# Patient Record
Sex: MALE | Race: WHITE | NOT HISPANIC OR LATINO | Employment: UNEMPLOYED | URBAN - METROPOLITAN AREA
[De-identification: names, ages, dates, MRNs, and addresses within clinical notes are randomized per-mention and may not be internally consistent; named-entity substitution may affect disease eponyms.]

---
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
## 2020-01-24 ENCOUNTER — TELEPHONE (OUTPATIENT)
Dept: NEUROLOGY | Facility: CLINIC | Age: 20
End: 2020-01-24

## 2020-03-10 ENCOUNTER — HEALTH MAINTENANCE LETTER (OUTPATIENT)
Age: 20
End: 2020-03-10

## 2020-04-08 ASSESSMENT — ENCOUNTER SYMPTOMS
MYALGIAS: 1
JOINT SWELLING: 0
MUSCLE CRAMPS: 1
SKIN CHANGES: 0
POOR WOUND HEALING: 0
MUSCLE WEAKNESS: 1
STIFFNESS: 0
NAIL CHANGES: 0
ARTHRALGIAS: 0
NECK PAIN: 0
BACK PAIN: 0

## 2020-04-20 ENCOUNTER — VIRTUAL VISIT (OUTPATIENT)
Dept: NEUROLOGY | Facility: CLINIC | Age: 20
End: 2020-04-20
Payer: COMMERCIAL

## 2020-04-20 DIAGNOSIS — G60.0 HEREDITARY SENSORIMOTOR NEUROPATHY: Primary | ICD-10-CM

## 2020-04-20 NOTE — PROGRESS NOTES
"Noah Ennis is a 19 year old male who is being evaluated via a billable video visit.      The patient has been notified of following:     \"This video visit will be conducted via a call between you and your physician/provider. We have found that certain health care needs can be provided without the need for an in-person physical exam.  This service lets us provide the care you need with a video conversation.  If a prescription is necessary we can send it directly to your pharmacy.  If lab work is needed we can place an order for that and you can then stop by our lab to have the test done at a later time.    Video visits are billed at different rates depending on your insurance coverage.  Please reach out to your insurance provider with any questions.    If during the course of the call the physician/provider feels a video visit is not appropriate, you will not be charged for this service.\"    Patient has given verbal consent for Video visit? Yes    How would you like to obtain your AVS?     Patient would like the video invitation sent by: Send to e-mail at: [email or web address]          "

## 2020-04-20 NOTE — PROGRESS NOTES
"[facility], [place]   Muscular Dystrophy video visit Note  Noah Ennis  [id number]  04/20/2020    Noah Ennis is a 19 year old male who is being evaluated via a billable video visit.      The patient has been notified of following:     \"This video visit will be conducted via a call between you and your physician/provider. We have found that certain health care needs can be provided without the need for an in-person physical exam.  This service lets us provide the care you need with a video conversation.  If a prescription is necessary we can send it directly to your pharmacy.  If lab work is needed we can place an order for that and you can then stop by our lab to have the test done at a later time.    Video visits are billed at different rates depending on your insurance coverage.  Please reach out to your insurance provider with any questions.    If during the course of the call the physician/provider feels a video visit is not appropriate, you will not be charged for this service.\"    Patient has given verbal consent for Video visit? Yes    How would you like to obtain your AVS? 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       Interval history:   Patient reported he is alright. He joined a program transition program for cognitive improvement and adaptive life skills. He has been stable and showing no significant change. His main concern that his feet will get cold for the last 3 months.  In the last 3 months he is mainly staying at home. He walks around from time to time. Usually he can walk up to 2 miles without difficulty. When he walks he stops in times as he gets tired. He gets intermittent arms numbness mainly when they are crossed. It does not last for long time " He sleeping well. Appetite is alright. No change in vision or hearing. No breathing or swallowing issues. He denied any fall. No stumbling when he walks.     ROS:  A 10-point ROS was performed, negative except as per HPI.  Answers for HPI/ROS submitted by the patient on 4/8/2020   General Symptoms: No  Skin Symptoms: Yes  HENT Symptoms: No  EYE SYMPTOMS: No  HEART SYMPTOMS: No  LUNG SYMPTOMS: No  INTESTINAL SYMPTOMS: No  URINARY SYMPTOMS: No  REPRODUCTIVE SYMPTOMS: No  SKELETAL SYMPTOMS: Yes  BLOOD SYMPTOMS: No  NERVOUS SYSTEM SYMPTOMS: No  MENTAL HEALTH SYMPTOMS: No  PEDS Symptoms: No  Changes in hair: No  Changes in moles/birth marks: No  Itching: No  Rashes: No  Changes in nails: No  Acne: No  Change in facial hair: No  Warts: No  Non-healing sores: No  Scarring: No  Flaking of skin: No  Color changes of hands/feet in cold : Yes  Sun sensitivity: No  Skin thickening: No  Back pain: No  Muscle aches: Yes  Neck pain: No  Swollen joints: No  Joint pain: No  Bone pain: No  Muscle cramps: Yes  Muscle weakness: Yes  Joint stiffness: No  Bone fracture: No    PMH:  Past Medical History:   Diagnosis Date     ADHD (attention deficit hyperactivity disorder)      Asthma      Paresthesias     arms and legs       PSH:  Past Surgical History:   Procedure Laterality Date     DENTAL SURGERY       ELECTROMYOGRAM N/A 6/22/2016    Procedure: ELECTROMYOGRAM;  Surgeon: Darwin Walls MD;  Location:  PEDS SEDATION      ENDOSCOPY         Allergies:  Allergies   Allergen Reactions     Dust Mites      Mold      Seasonal Allergies      Strattera [Atomoxetine] Hives       Medications:    Current Outpatient Medications:      albuterol (PROAIR HFA, PROVENTIL HFA, VENTOLIN HFA) 108 (90 BASE) MCG/ACT inhaler, Inhale 2 puffs into the lungs every 4 hours as needed for shortness of breath / dyspnea or wheezing, Disp: , Rfl:      albuterol (PROVENTIL) (5 MG/ML) 0.5% nebulizer solution, Take 2.5 mg by nebulization every 6 hours as  needed for wheezing or shortness of breath / dyspnea, Disp: , Rfl:      budesonide-formoterol (SYMBICORT) 160-4.5 MCG/ACT inhaler, Inhale 2 puffs into the lungs as needed, Disp: , Rfl:      budesonide-formoterol (SYMBICORT) 80-4.5 MCG/ACT inhaler, Inhale 2 puffs into the lungs 2 times daily, Disp: , Rfl:      cetirizine (ZYRTEC ALLERGY) 10 MG tablet, Take 10 mg by mouth daily, Disp: , Rfl:      fluticasone (FLONASE) 50 MCG/ACT nasal spray, Spray 2 sprays into both nostrils 2 times daily , Disp: , Rfl:      Montelukast Sodium (SINGULAIR PO), Take 5 mg by mouth At Bedtime, Disp: , Rfl:      Multiple Vitamins-Minerals (MULTIVITAMIN GUMMIES ADULTS) CHEW, Take 2 chew tab by mouth daily, Disp: , Rfl:      Ascorbic Acid (VITAMIN C) 100 MG CHEW, , Disp: , Rfl:      CLONAZEPAM PO, Take 0.5 mg by mouth At Bedtime, Disp: , Rfl:      Methylphenidate HCl (METADATE CD PO), Take 50 mg by mouth daily, Disp: , Rfl:     Social History:  Social History     Tobacco Use     Smoking status: Never Smoker     Smokeless tobacco: Never Used   Substance Use Topics     Alcohol use: No     Alcohol/week: 0.0 standard drinks       Family History:  No family history on file.      Objective   There were no vitals taken for this visit.  General: patient sitting with no acute distress  HEENT: no oral ulcers. Normocephalic atraumatic   Neurological examination: Mental status:  Patient is alert, attentive, and oriented x 3.  Language is coherent and fluent without dysarthria or aphasia.  Memory, comprehension and ability to follow commands were intact.     Gait: he was able to walk on toes and heels. He was able to hub on one foot. He has negative Romberg's. He has some difficulty with tandem gait.   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 This is however is not certain. There is insufficient  evidence to definitively conclude that this variant is the cause of this  patient's neurologic findings. Analysis of other family members, if   possible, may help to clarify whether this variant is segregating with  the phentoype in the family. ).     Overall, he is doing well apart from coldness in his feet. He tries to keep active life style and doing exercises at home. His dad was not tested for the same mutation yet.      Plan:  - He was advised to do daily exercises    - Consider genetic testing for the patient's father for clarity of diagnosis- Mom told us she will ask the father and let us know if he is interested   -We discussed that our genetic counselor can also reach out  - Follow up in the [place] [facility] with Dr. Em in 1 year    Video-Visit Details    Type of service:  Video Visit    Video Start Time: 15:05  Video End Time: 15:25    Originating Location (pt. Location): Home    Distant Location (provider location):  [facility] NEUROLOGY     Mode of Communication:  Video Conference via [employer]      I personally examined this patient, reviewed vital signs and pertinent auxiliary test results.  This note details our findings, impression and plan that we formulated together.    I spent total of 20 minutes face-to-face with Noah Ennis during today's visit. Over 50% of this time was spent counseling the patient and coordinating care. See note for details.    Sincerely yours,      Darwin Walls MD  Pediatric Neurology  [phone]

## 2020-04-22 ENCOUNTER — TELEPHONE (OUTPATIENT)
Dept: CONSULT | Facility: CLINIC | Age: 20
End: 2020-04-22

## 2020-04-22 NOTE — TELEPHONE ENCOUNTER
Left a voicemail for Noah's mother, Ingrid, regarding referral for genetic counseling. I provided my call back number and asked Ingrid to call me back to schedule a genetic counseling visit.     Evangelina Bermudez MS, [facility]  Genetic Counselor  [facility]  Phone: [phone]  Fax: [phone]

## 2020-09-17 NOTE — TELEPHONE ENCOUNTER
Noah's mother Ingrid returned my call and we arranged for a telephone genetic counseling visit for Noah on Friday 9/25 at 10am. They can be called at [phone] for the visit.     Evangelina Bermudez MS, [facility]  Licensed Genetic Counselor  [facility]  Phone: [phone]  Fax: [phone]

## 2020-10-23 ENCOUNTER — TELEPHONE (OUTPATIENT)
Dept: CONSULT | Facility: CLINIC | Age: 20
End: 2020-10-23

## 2020-10-23 NOTE — TELEPHONE ENCOUNTER
Ingrid left me a voicemail saying they need to reschedule Noah's telephone genetic counseling visit today as he is not feeling well.     I called Ingrid back and left her a voicemail saying we will cancel today's visit. I gave her my phone number if she wants to leave me a message to reschedule. I also gave her my email address ([email or web address]) in case that form of communicating is better for them.     Evangelina Bermudez MS, [facility]  Licensed Genetic Counselor  [facility]  Phone: [phone]  Fax: [phone]

## 2020-10-28 ENCOUNTER — VIRTUAL VISIT (OUTPATIENT)
Dept: PEDIATRIC NEUROLOGY | Facility: CLINIC | Age: 20
End: 2020-10-28
Attending: PSYCHIATRY & NEUROLOGY
Payer: COMMERCIAL

## 2020-10-28 DIAGNOSIS — G60.0 CHARCOT-MARIE-TOOTH DISEASE: Primary | ICD-10-CM

## 2020-10-28 DIAGNOSIS — Z71.83 ENCOUNTER FOR NONPROCREATIVE GENETIC COUNSELING: ICD-10-CM

## 2020-10-28 PROCEDURE — 96040 HC GENETIC COUNSELING, EACH 30 MINUTES: CPT | Mod: 95 | Performed by: GENETIC COUNSELOR, MS

## 2020-10-28 NOTE — PROGRESS NOTES
"Noah Ennis is a 19 year old male who is being evaluated via a billable telephone visit.      The patient has been notified of following:     \"This telephone visit will be conducted via a call between you and your physician/provider. We have found that certain health care needs can be provided without the need for a physical exam.  This service lets us provide the care you need with a short phone conversation.  If a prescription is necessary we can send it directly to your pharmacy.  If lab work is needed we can place an order for that and you can then stop by our lab to have the test done at a later time.    Telephone visits are billed at different rates depending on your insurance coverage. During this emergency period, for some insurers they may be billed the same as an in-person visit.  Please reach out to your insurance provider with any questions.    If during the course of the call the physician/provider feels a telephone visit is not appropriate, you will not be charged for this service.\"    Patient has given verbal consent for Telephone visit?  Yes    What phone number would you like to be contacted at? [phone]      Phone call duration: 43 minutes    Tanesha Bermudez GC        [facility] GENETIC COUNSELING CONSULT NOTE    Date: 10/28/20     Presenting Information:   Noah Ennis is a 19 year old male referred to the [facility] due to Charcot-Olya-Tooth (CMT) disease. He was seen for a telephone genetic counseling appointment at the request of Dr. Wlals today. He was accompanied by his mother Ingrid.     Personal History:   Noah has a history of hereditary sensory and motor peripheral neuropathy. He first started noticing symptoms when he was about 13-14 years old and reports that his symptoms of back pain, fatigue, and tingling in his hands and feet have gotten progressively worse. He reports that his walking is getting worse as well and reports that his legs get " tired going up stairs. Noah has been followed by Dr. Walls for several years for his neuropathy. 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     Noah's other notable medical history includes diagnoses of ADHD, asthma, allergies, mild autism spectrum disorder, and carpal tunnel. He wears glasses and sees and optometrist annually. Noah denies any hearing loss, seizures, GI problems, and cardiac and respiratory concerns.    Please refer to Dr. Walls's most recent visit (4/20/20) for further details of Noah's medical history.    Family History: A three generation pedigree was obtained and scanned into the electronic medical record. The relevant portions are described below:      Siblings- Noah has a 21 year old brother who has Asperger's and is otherwise heathy.     Parents- Noah's mother, Ingrid, is 40 years old. She has a history of endometriosis but is otherwise healthy. Noah's father, Johnnie, is 45 years old. He has a history of numbness and tingling and possible lower extremity neuropathy since his 20's. He also has carpal tunnel and degenerative disk disease. He has not had a Neurology evaluation for his neuropathy.    Maternal Relatives- Noah has two maternal aunts. One aunt has a history of seizures in adulthood which are well controlled on medication. She has a 22 year old son and 17 year old daughter who are healthy. The other aunt has a history of thyroid problems. Noah's maternal grandmother has high cholesterol and is otherwise healthy. Noah's maternal grandfather has a history of high cholesterol and has a pacemaker placed.     Paternal Relatives- Noah has one paternal uncle and two paternal aunts. His uncle is alive and well, as are his children. One of his aunts has thyroid problems and skin cancer. She has a daughter with autism and diabetes and her other children are healthy. The other  aunt is alive and well, as are her children. Noah's paternal grandmother has a history of diabetes, thyroid problems, and skin cancer. Noah's paternal grandfather had a history of carpal tunnel and passed away at age 59 due to congestive heart failure.     Family history is otherwise largely non-contributory. Maternal ancestry is [place], Citizen of [place], and [place] and paternal ancestry is Citizen of [place], [place], [place]i, and English. Consanguinity was denied.     Previous Genetic Testin/29/16- PMP22 deletion/duplication analysis: negative (2 copies of PMP22)    16- CMT1, dominant intermediate, recessive intermediate CMT Panel (27 genes):    MPZ c.646-3C>G (intron 5) Variant of uncertain significance    Genetic Counseling Discussion:  We reviewed that our bodies are made of cells that contain our chromosomes which are made up of long stretches of DNA containing our genes. Our genes serve as the instructions for our bodies to grow and function. We have two copies of each gene, one inherited from our mother and one inherited from our father.    Noah and his family previously saw genetic counselor, Alyssa Lilly, so today we reviewed the previous genetic testing Noah had. Noah had testing for the PMP22 duplication associated with CMT1A, which is the most common type of type 1 CMT. The results of that testing were negative/normal. Then Noah had a 27 gene panel target toward CMT1, dominant intermediate CMT, and recessive intermediate CMT based on Noah's EMG findings. This panel identified one variant of uncertain significance (VUS) in the MPZ gene called c.646-3C>G (intron 5). We reviewed that a VUS means the lab found a change in the gene but they do not have enough evidence or data yet to know if the change in the gene is associated with causing disease (pathogenic) or if it is a harmless change (benign).     The MPZ gene is associated with with various types of autosomal dominant CMT:  CMT1B, CMT2I/J, and DI-CMTD. Autosomal dominant means an individual needs one pathogenic variant in the gene in order to be affected. It is possible that this could be the explanation of Noah's CMT, but we do not have enough evidence about this variant to determine that at this time. Previously parental testing of this MPZ VUS was recommended to determine if this variant was inherited from Noah's father (who has some history of neuropathy), his mother, or if it is a new change (de madelaine) in Noah. Parental studies may help us with our interpretation of the variant but they may not be enough to re-classify the VUS at this time. The 27 gene panel Noah had in 2018 did not include all genes known to cause CMT, so It is also possible that he could have a pathogenic variant in a different gene that is causing his neuropathy symptoms.     Parental testing for the MPZ VUS was discussed previously and declined. We discussed this option again today. We also discussed doing another Next Generation Sequencing (NGS) panel for analysis of additional genes known to be associated with hereditary neuropathies, with the aim of determining what condition is causing Noah's neuropathy/CMT symptoms. We discussed the availability of a sponsored gene panel test through [facility] sponsored by [facility]: [facility]. Because the testing is a part of a sponsored program, the testing is free of charge but [facility], as the sponsoring party, can share the genetic information with third parties for research purposes. No personally identifiable information will be shared, only the gene mutation information and clinical indications. We can also do this genetic testing panel through Noah's insurance if they prefer. This panel will analyze the MPZ gene again and will find the same variant found previously, but I do not have updated information on how the lab will classify this variant.     We  went on to discuss the details, limitations, and possible outcomes of NGS. In particular, We discussed that there are three possible results from NGS:    Negative: meaning normal or no mutations are identified in the genes that were tested/sequenced    Positive: meaning a mutation that is known to be associated with a particular set of symptoms is identified    Variant of uncertain significance (VUS): meaning a change in the DNA sequence of a particular gene was seen but there is not enough information or data yet to know if it explains the symptoms. If a VUS is identified, testing of other relatives may be helpful to provide clarification.  In most cases, identification of a VUS does not confirm a diagnosis and does not result in any clinically actionable recommendations.    We discussed the potential benefits of genetic testing and why this genetic testing is medically indicated. A positive result will help determine the etiology of the neuropathy noted in Noah and may guide the medical management for him. Also, if a genetic cause is found for Noah's neuropathy, it will give us a more accurate risk assessment for other family members, particularly Noah's future children.    Next Generation Sequencing is a well established technology utilized by all molecular genetic labs throughout the country for identifying disease-causing mutations in various genes.  Next Generation Sequencing is currently the standard of care for genetic testing of single genes.  The recommended testing for Noah  is DIAGNOSTIC testing, and it is NOT investigational.    Noah consented to genetic testing today. He would prefer to do the [facility] panel through insurance. We will submit information for prior authorization and Noah will be contacted regarding the authorization status and potential cost of testing. If we proceed with testing, a saliva sample collection kit will be mailed to Noah and  I will call him with the results.     It was a pleasure meeting Noah and his mother today. They were encouraged to reach out to me if they have any further questions.     Plan:  [facility]. [facility]      Evangelina Bermudez MS, [facility]  Licensed Genetic Counselor  [facility]  Phone: [phone]  Fax: [phone]    Time spent in consultation was approximately 45 minutes.

## 2020-11-15 DIAGNOSIS — G60.0 CHARCOT-MARIE-TOOTH DISEASE: ICD-10-CM

## 2020-11-27 LAB — MISCELLANEOUS TEST: NORMAL

## 2020-12-07 ENCOUNTER — TELEPHONE (OUTPATIENT)
Dept: PEDIATRIC NEUROLOGY | Facility: CLINIC | Age: 20
End: 2020-12-07

## 2020-12-07 NOTE — TELEPHONE ENCOUNTER
I spoke with Noah's mother, Ingrid, who was present for his genetic counseling visit regarding the results of Noah's genetic testing. We reviewed that Noah had genetic testing for 81 genes associated with various types of hereditary neuropathy. This large gene panel identified one variant of uncertain significance in the MPZ gene called c.646-1C>G (intronic). This is the same variant that was identified on Noah's CMT1 panel.     Unfortunately, this test did not identify any new variants in the new genes that were analyzed and this genetic testing laboratory ([facility]) has this variant classified as a variant of uncertain (VUS) significance just like Noah's testing at the [facility] in 2016. [facility] is offering free parental testing as a part of their VUS resolution program. The parental testing will only look for this MPZ variant in Noah's parents and will not be analyzing other genes. Ingrid and Johnnie are interested in parental testing so [facility] saliva sample collection kits to her and Johnnie for sample collection. I will call them as soon as I have the results.     Evangelina Bermudez MS, [facility]  Licensed Genetic Counselor  [facility]  Phone: [phone]  Fax: [phone]

## 2021-01-22 ENCOUNTER — TELEPHONE (OUTPATIENT)
Dept: PEDIATRIC NEUROLOGY | Facility: CLINIC | Age: 21
End: 2021-01-22

## 2021-01-22 NOTE — TELEPHONE ENCOUNTER
I spoke with Noah's mother, Ingrid, about the results of the parental VUS testing we did for both of Noah's parents for the variant of uncertain significance in the MPZ gene called c.646-1C>G (intronic).     This VUS was identified in Noah's mother, Ingrid, and was not identified in his father, Johnnie. Given that his father is the one who has some neuropathy symptoms (numbness and tingling and possible lower extremity neuropathy since his 20's) and that this variant was not found in him, this lowers my suspicion that this MPZ variant is the main contributing cause to Tinos CMT. However, this variant is still classified as a variant of uncertain significance at this time.    We had previously discussed the option of a broader genetic test such as exome sequencing to determine the cause of Tinos CMT. Ingrid and I discussed this briefly again and she said she would talk with Noah and Johnnie to see if they were interested in pursuing further testing. She will email me if they are interesting in further testing. In the meantime, I will share these results with Dr. Walls and I will main Ingrid and Johnnie copies of their test reports. Ingrid was encouraged to reach out to me if the family has any other questions.     Evangelina Bermudez MS, [facility]  Licensed Genetic Counselor  [facility]  Phone: [phone]  Fax: [phone]

## 2021-02-06 ENCOUNTER — MYC MEDICAL ADVICE (OUTPATIENT)
Dept: PEDIATRIC NEUROLOGY | Facility: CLINIC | Age: 21
End: 2021-02-06

## 2021-02-11 NOTE — TELEPHONE ENCOUNTER
I called Noha's mother, Ingrid, to discuss some other testing options. I told Ingrid that I spoke with Dr. Walls and we both think it would be a good idea to get Noah's father, Johnnie, evaluated and possibly do an EMG to get a better clinical picture of his likely neuropathy. This might help give us more information about if Noah and his dad have a similar presentation (meaning this is more likely to be genetic) or if they have different presentations and if there could be a different underlying cause for Noah's neuropathy. If the family is interested in pursuing further genetic testing, the next option would be exome sequencing which looks at almost all of the genes. This testing is best done when parental samples are included in the analysis and again it would be beneficial to get a good clinical picture of Noah's father for the analysis on this testing. The yield of exome sequencing is probably a little lower since we have tested Noah for the most likely CMT genes on the previous multi-gene panels, but there are still a few CMT genes or candidate genes that he has not been tested for.     Ingrid appreciated this information and will discuss more with Dr. Walls at Noah's upcoming video visit on 3/1/21. She also asked that I look into the insurance coverage/cost of exome sequencing so they have that information to consider as well. I will ask the genetic testing lab to do a benefits investigation and send Ingrid the cost information.     Evangelina Bermudez MS, [facility]  Licensed Genetic Counselor  [facility] [place] [facility]  Phone: [phone]  Fax: [phone]

## 2021-03-01 ENCOUNTER — VIRTUAL VISIT (OUTPATIENT)
Dept: NEUROLOGY | Facility: CLINIC | Age: 21
End: 2021-03-01
Payer: COMMERCIAL

## 2021-03-01 DIAGNOSIS — G60.9 IDIOPATHIC PERIPHERAL NEUROPATHY: Primary | ICD-10-CM

## 2021-03-01 PROCEDURE — 99213 OFFICE O/P EST LOW 20 MIN: CPT | Mod: GT | Performed by: PSYCHIATRY & NEUROLOGY

## 2021-03-01 RX ORDER — ASCORBIC ACID 100 MG
1 TABLET,CHEWABLE ORAL DAILY
COMMUNITY
End: 2021-11-09

## 2021-03-01 RX ORDER — DULOXETIN HYDROCHLORIDE 60 MG/1
60 CAPSULE, DELAYED RELEASE ORAL DAILY
COMMUNITY
Start: 2020-11-12

## 2021-03-01 RX ORDER — ONDANSETRON 8 MG/1
8 TABLET, FILM COATED ORAL EVERY 8 HOURS PRN
COMMUNITY
Start: 2020-10-24 | End: 2021-10-24

## 2021-03-01 RX ORDER — SODIUM FLUORIDE 5 MG/ML
2 PASTE, DENTIFRICE DENTAL DAILY
COMMUNITY
Start: 2020-10-07

## 2021-03-01 NOTE — PROGRESS NOTES
Noah is a 20 year old who is being evaluated via a billable video visit.      How would you like to obtain your AVS? Mychart  If the video visit is dropped, the invitation should be resent by: [phone]        Video-Visit Details    Type of service:  Video Visit      Originating Location (pt. Location): Home    Distant Location (provider location):  [facility] NEUROLOGY CLINIC [place]     Platform used for Video Visit: [employer]

## 2021-03-01 NOTE — LETTER
3/1/2021       RE: Noah Ennis  [address]  [facility] [address]     Dear Colleague,    Thank you for referring your patient, Noah Ennis, to the [facility] NEUROLOGY CLINIC [place] at [facility]. Please see a copy of my visit note below.    Noah is a 20 year old who is being evaluated via a billable video visit.      How would you like to obtain your AVS? Mychart  If the video visit is dropped, the invitation should be resent by: [phone]        Video-Visit Details    Type of service:  Video Visit      Originating Location (pt. Location): Home    Distant Location (provider location):  [facility] NEUROLOGY [facility]     Platform used for Video Visit: [employer]                   [facility]                  Muscular Dystrophy Video Clinic Note     Noah Ennis MRN# [id number]   YOB: 2000 Age: 20 year old      Date of Visit: Mar 1, 2021    Primary care provider: Katerin Smith      History is obtained from the patient, family and medical record       Interval Change:      Noah Ennis is a 20 year old male was seen and examined at the muscular dystrophy clinic on Mar 1, 2021 via video visit for evaluation of previously 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 He reports concerns regarding worsening of his symptoms. He reports that his legs are getting worse. As he is taking stairs he feels he needs to be more careful. He does not fall bu he stumbles and trip He has no changes in his hands . He has occasional tingling. He has some issues with balance. He feels clamsy.  He uses insole but no AFO.               Allergies:      Allergies   Allergen Reactions     Dust Mites      Mold      Seasonal Allergies      Strattera [Atomoxetine] Hives             Medications:     Prescription Medications as of 3/1/2021       Rx Number Disp Refills  Start End Last Dispensed Date Next Fill Date Owning Pharmacy    albuterol (PROAIR HFA, PROVENTIL HFA, VENTOLIN HFA) 108 (90 BASE) MCG/ACT inhaler            Sig: Inhale 2 puffs into the lungs every 4 hours as needed for shortness of breath / dyspnea or wheezing    Class: Historical    Route: Inhalation    albuterol (PROVENTIL) (5 MG/ML) 0.5% nebulizer solution            Sig: Take 2.5 mg by nebulization every 6 hours as needed for wheezing or shortness of breath / dyspnea    Class: Historical    Route: Nebulization    Ascorbic Acid (VITAMIN C) 100 MG CHEW        [facility] - [place], [place] - [address]    Class: Historical    Route: Oral    budesonide-formoterol (SYMBICORT) 160-4.5 MCG/ACT inhaler            Sig: Inhale 2 puffs into the lungs as needed    Class: Historical    Route: Inhalation    budesonide-formoterol (SYMBICORT) 80-4.5 MCG/ACT inhaler            Sig: Inhale 2 puffs into the lungs 2 times daily    Class: Historical    Route: Inhalation    cetirizine (ZYRTEC ALLERGY) 10 MG tablet            Sig: Take 10 mg by mouth daily    Class: Historical    Route: Oral    CLONAZEPAM PO            Sig: Take 0.5 mg by mouth At Bedtime    Class: Historical    Route: Oral    DULoxetine (CYMBALTA) 60 MG capsule    11/12/2020        Sig: Take 60 mg by mouth daily    Class: Historical    Route: Oral    fluticasone (FLONASE) 50 MCG/ACT nasal spray            Sig: Spray 2 sprays into both nostrils 2 times daily     Class: Historical    Route: Both Nostrils    Methylphenidate HCl (METADATE CD PO)            Sig: Take 50 mg by mouth daily    Class: Historical    Route: Oral    Montelukast Sodium (SINGULAIR PO)            Sig: Take 5 mg by mouth At Bedtime    Class: Historical    Route: Oral    Multiple Vitamin (QUINTABS) TABS            Sig: Take 1 tablet by mouth daily    Class: Historical    Route: Oral    Multiple Vitamins-Minerals (MULTIVITAMIN GUMMIES ADULTS) CHEW            Sig: Take 2 chew tab by mouth daily     Class: Historical    Route: Oral    ondansetron (ZOFRAN) 8 MG tablet    10/24/2020 10/24/2021       Sig: Take 8 mg by mouth every 8 hours as needed    Class: Historical    Route: Oral    sodium fluoride (DENTA 5000 PLUS) 1.1 % CREA    10/7/2020        Sig: Take 2 Application by mouth daily    Class: Historical    Route: Oral                Review of Systems:   The 10 point Review of Systems is negative other than noted in the HPI         Physical Exam:     There were no vitals taken for this visit.   Physical Exam:   General: NAD  Neurologic:   Mental Status Exam: Alert, awake and easily engaged in interaction.   Cranial Nerves: PERRLA, EOMs intact, no nystagmus, facial movements symmetric,            Motor:Ambulatory. Able to walk with minimal steppage   Gait:Normal              Assessment and Recommendations:   Noah Ennis is a 20 year old male who presents with idiopathic sensory and motor peripheral polyneuropathy previously thought due to presumed etiology due to heterozygous MPZ gene mutation (CMT1B) (One variant with possible clinical  significance was detected in the MPZ gene). His mother was found to sghare the same mutation which makes Noah's etiology unclear. Due to worsening and unknown specific etiology repeat testing with EMG is recommended.     Recommendations:  -Repeat EMG  -PT evaluation  -Consider braces  -Follow up in 1 year or sooner.    Noah is a 20 year old who is being evaluated via a billable video visit.      How would you like to obtain your AVS? MyChart  If the video visit is dropped, the invitation should be resent by:   Will anyone else be joining your video visit? No    Video Start Time: 6:40 PM      Video-Visit Details    Type of service:  Video Visit    Video End Time:6:55 PM    Originating Location (pt. Location): Home    Distant Location (provider location):  [facility] NEUROLOGY [facility]     Platform used for Video Visit: [employer]  I have spent  20 min  spent on the date of the encounter in chart review, patient visit, review of tests, counseling the patient, documentation and/or discussion with other providers about the issues documented above. See note for details.    Sincerely,        Darwin Walls MD  Pediatric Neurology  [phone]           Patient Care Team:  Katerin Smith MD as PCP - General (Family Practice)  Nelson, Merlin V as Referring Physician (Neurology)      Copy to patient  ROSA ROCHA  [address] [facility] [address]

## 2021-03-02 NOTE — PROGRESS NOTES
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 He reports concerns regarding worsening of his symptoms. He reports that his legs are getting worse. As he is taking stairs he feels he needs to be more careful. He does not fall bu he stumbles and trip He has no changes in his hands . He has occasional tingling. He has some issues with balance. He feels clamsy.  He uses insole but no AFO.               Allergies:      Allergies   Allergen Reactions     Dust Mites      Mold      Seasonal Allergies      Strattera [Atomoxetine] Hives             Medications:     Prescription Medications as of 3/1/2021       Rx Number Disp Refills Start End Last Dispensed Date Next Fill Date Owning Pharmacy    albuterol (PROAIR HFA, PROVENTIL HFA, VENTOLIN HFA) 108 (90 BASE) MCG/ACT inhaler            Sig: Inhale 2 puffs into the lungs every 4 hours as needed for shortness of breath / dyspnea or wheezing    Class: Historical    Route: Inhalation    albuterol (PROVENTIL) (5 MG/ML) 0.5% nebulizer solution            Sig: Take 2.5 mg by nebulization every 6 hours as needed for wheezing or shortness of breath / dyspnea    Class: Historical    Route: Nebulization    Ascorbic Acid (VITAMIN C) 100 MG CHEW        [facility] - [place], [place] - [address]    Class: Historical    Route: Oral    budesonide-formoterol (SYMBICORT) 160-4.5 MCG/ACT inhaler            Sig: Inhale 2 puffs into the lungs as needed     Class: Historical    Route: Inhalation    budesonide-formoterol (SYMBICORT) 80-4.5 MCG/ACT inhaler            Sig: Inhale 2 puffs into the lungs 2 times daily    Class: Historical    Route: Inhalation    cetirizine (ZYRTEC ALLERGY) 10 MG tablet            Sig: Take 10 mg by mouth daily    Class: Historical    Route: Oral    CLONAZEPAM PO            Sig: Take 0.5 mg by mouth At Bedtime    Class: Historical    Route: Oral    DULoxetine (CYMBALTA) 60 MG capsule    11/12/2020        Sig: Take 60 mg by mouth daily    Class: Historical    Route: Oral    fluticasone (FLONASE) 50 MCG/ACT nasal spray            Sig: Spray 2 sprays into both nostrils 2 times daily     Class: Historical    Route: Both Nostrils    Methylphenidate HCl (METADATE CD PO)            Sig: Take 50 mg by mouth daily    Class: Historical    Route: Oral    Montelukast Sodium (SINGULAIR PO)            Sig: Take 5 mg by mouth At Bedtime    Class: Historical    Route: Oral    Multiple Vitamin (QUINTABS) TABS            Sig: Take 1 tablet by mouth daily    Class: Historical    Route: Oral    Multiple Vitamins-Minerals (MULTIVITAMIN GUMMIES ADULTS) CHEW            Sig: Take 2 chew tab by mouth daily    Class: Historical    Route: Oral    ondansetron (ZOFRAN) 8 MG tablet    10/24/2020 10/24/2021       Sig: Take 8 mg by mouth every 8 hours as needed    Class: Historical    Route: Oral    sodium fluoride (DENTA 5000 PLUS) 1.1 % CREA    10/7/2020        Sig: Take 2 Application by mouth daily    Class: Historical    Route: Oral                Review of Systems:   The 10 point Review of Systems is negative other than noted in the HPI         Physical Exam:     There were no vitals taken for this visit.   Physical Exam:   General: NAD  Neurologic:   Mental Status Exam: Alert, awake and easily engaged in interaction.   Cranial Nerves: PERRLA, EOMs intact, no nystagmus, facial movements symmetric,            Motor:Ambulatory. Able to walk with minimal  steppage   Gait:Normal              Assessment and Recommendations:   Noah Ennis is a 20 year old male who presents with idiopathic sensory and motor peripheral polyneuropathy previously thought due to presumed etiology due to heterozygous MPZ gene mutation (CMT1B) (One variant with possible clinical  significance was detected in the MPZ gene). His mother was found to sghare the same mutation which makes Noah's etiology unclear. Due to worsening and unknown specific etiology repeat testing with EMG is recommended.     Recommendations:  -Repeat EMG  -PT evaluation  -Consider braces  -Follow up in 1 year or sooner.    Noah is a 20 year old who is being evaluated via a billable video visit.      How would you like to obtain your AVS? MyChart  If the video visit is dropped, the invitation should be resent by:   Will anyone else be joining your video visit? No    Video Start Time: 6:40 PM      Video-Visit Details    Type of service:  Video Visit    Video End Time:6:55 PM    Originating Location (pt. Location): Home    Distant Location (provider location):  [facility] NEUROLOGY CLINIC [place]     Platform used for Video Visit: [employer]  I have spent  20 min spent on the date of the encounter in chart review, patient visit, review of tests, counseling the patient, documentation and/or discussion with other providers about the issues documented above. See note for details.    Sincerely,        Sarah Kenney MD  Pediatric Neurology  [phone]         CC  Patient Care Team:  Katerin Smith MD as PCP - General (Family Practice)  Nelson, Merlin V as Referring Physician (Neurology)  Sarah Kenney MD as MD (Pediatric Neurology)  Sarah Kenney MD as Assigned Neuroscience Provider  SARAH KENNEY    Copy to patient  NOAH ENNIS  [address] [facility] [address]

## 2021-04-24 ENCOUNTER — HEALTH MAINTENANCE LETTER (OUTPATIENT)
Age: 21
End: 2021-04-24

## 2021-07-08 ENCOUNTER — MYC MEDICAL ADVICE (OUTPATIENT)
Dept: NEUROLOGY | Facility: CLINIC | Age: 21
End: 2021-07-08

## 2021-07-20 ENCOUNTER — TELEPHONE (OUTPATIENT)
Dept: NEUROLOGY | Facility: CLINIC | Age: 21
End: 2021-07-20

## 2021-07-20 NOTE — TELEPHONE ENCOUNTER
LM for patients mother to call back and set up an EMG in peds discovery.     Shanta Engel   Community Ref[place] [address] [address]  [phone]  [email or web address]

## 2021-07-21 NOTE — TELEPHONE ENCOUNTER
LM for patients mother to call me back again for and EMG with Dr. HOOPER Told mom will be a sept visit.    Shanta Engel   [facility] Referral Specialist  [address]  [phone]  [email or web address]

## 2021-07-22 NOTE — TELEPHONE ENCOUNTER
Spoke with patients mother and scheduled EMG in discovery clinic per Dr. HOOPER Confirm appt with mom.    Shanta Engel   [facility] Referral Specialist  [address]  [phone]  [email or web address]

## 2021-10-08 ENCOUNTER — TELEPHONE (OUTPATIENT)
Dept: NEUROLOGY | Facility: CLINIC | Age: 21
End: 2021-10-08

## 2021-10-08 NOTE — TELEPHONE ENCOUNTER
[facility] Call Center    Phone Message    May a detailed message be left on voicemail: yes     Reason for Call: mom needs to make an appt     Action Taken: Other: peds md    Travel Screening: Not Applicable

## 2021-10-09 ENCOUNTER — HEALTH MAINTENANCE LETTER (OUTPATIENT)
Age: 21
End: 2021-10-09

## 2021-11-05 PROBLEM — G60.0 CMT (CHARCOT-MARIE-TOOTH DISEASE): Status: ACTIVE | Noted: 2021-11-05

## 2021-11-05 LAB — LAB SCANNED RESULT: ABNORMAL

## 2021-11-09 ENCOUNTER — HOSPITAL ENCOUNTER (OUTPATIENT)
Dept: PHYSICAL THERAPY | Facility: CLINIC | Age: 21
Setting detail: THERAPIES SERIES
End: 2021-11-09
Attending: PSYCHIATRY & NEUROLOGY
Payer: COMMERCIAL

## 2021-11-09 ENCOUNTER — PATIENT OUTREACH (OUTPATIENT)
Dept: CARE COORDINATION | Facility: CLINIC | Age: 21
End: 2021-11-09
Payer: COMMERCIAL

## 2021-11-09 ENCOUNTER — DOCUMENTATION ONLY (OUTPATIENT)
Dept: ORTHOPEDICS | Facility: CLINIC | Age: 21
End: 2021-11-09

## 2021-11-09 ENCOUNTER — OFFICE VISIT (OUTPATIENT)
Dept: NEUROLOGY | Facility: CLINIC | Age: 21
End: 2021-11-09
Payer: COMMERCIAL

## 2021-11-09 VITALS
BODY MASS INDEX: 19.79 KG/M2 | SYSTOLIC BLOOD PRESSURE: 107 MMHG | RESPIRATION RATE: 12 BRPM | DIASTOLIC BLOOD PRESSURE: 72 MMHG | WEIGHT: 134 LBS | HEART RATE: 71 BPM | OXYGEN SATURATION: 99 %

## 2021-11-09 DIAGNOSIS — M25.373 ANKLE INSTABILITY: ICD-10-CM

## 2021-11-09 DIAGNOSIS — M21.6X1 ACQUIRED SUPINATION OF FOOT, RIGHT: ICD-10-CM

## 2021-11-09 DIAGNOSIS — N94.9 CMT (CERVICAL MOTION TENDERNESS): ICD-10-CM

## 2021-11-09 DIAGNOSIS — M21.6X2 ACQUIRED SUPINATION OF FOOT, LEFT: ICD-10-CM

## 2021-11-09 DIAGNOSIS — G60.0 CMT (CHARCOT-MARIE-TOOTH DISEASE): Primary | ICD-10-CM

## 2021-11-09 PROCEDURE — 97110 THERAPEUTIC EXERCISES: CPT | Mod: GP | Performed by: PHYSICAL THERAPIST

## 2021-11-09 PROCEDURE — 97161 PT EVAL LOW COMPLEX 20 MIN: CPT | Mod: GP | Performed by: PHYSICAL THERAPIST

## 2021-11-09 PROCEDURE — 99214 OFFICE O/P EST MOD 30 MIN: CPT | Mod: GC | Performed by: PSYCHIATRY & NEUROLOGY

## 2021-11-09 NOTE — Clinical Note
Hi Dr. Em, there is a pending orthotics order in this clinic encounter for you to sign. Let me know if you are unable to acces. I can send a different encounter if needed. Thanks.

## 2021-11-09 NOTE — PROGRESS NOTES
Social Work Note  [facility]     Patient Name:  Noah Ennis  /Age:  2000 (20 year old)    Referral Source: Dr Em - [facility]  Reason for Referral:  Housing     saw Patient in person as part of [facility] on 21, along with his mother, Ingrid.  Patient resides in [place] along with his mother and father.  He has one older brother that also resides in the area independently.  Patients main concerns and questions were surrounding housing and employment.      Patient and mother reported that he has completed high school and is currently part of a Step Program connected to the school system.  He shared that he has been diagnosed with Autism and ADHD.  He receives Social Security.  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      Noah shared that his goal is to find housing and a place of his own, mom agreed sharing that this is a good idea.  Patient stated that he currently doesn't have a  via the [facility].  Ingrid shared that one was supposed to be instated via his work program, but it never happened. Writer encouraged them to continue to persue this support and guided them through the process to get on the housing list via [facility].  Noah and his mother, Ingrid were both very understanding of process and [facility] resources.     Patient and Ingrid denied any additional concerns or questions.  Sw will continue to follow and assist as needed.  Provided writer contact information and encouraged them to call with additional concerns or questions.             ADONIS Larson, [facility]    [facility]  [phone]  [email or web address]

## 2021-11-09 NOTE — PATIENT INSTRUCTIONS
1. Recommend evaluation from Sports Medicine Provider for knee, external referral placed for you to be seen locally  2. Discuss with primary doctor a drug holiday trial from Duloxetine (will need to taper)  3. Track episodes of your chest pain and discuss possible causes with your primary care

## 2021-11-09 NOTE — PROGRESS NOTES
"S.  Pt. was seen today, at Dr. Em's CMT clinic in [place], for an evaluation for lower extremity bracing.    O.Goal.  To help stabilize ankles and reduce supination due to CMT.    A.  Pt. currently has custom made FO's that had worked for him well in the past but is due to new orthotics due to general wear.  His current fo's are several years old.  He is an active 20 year old male that wears hiking boots for extra support.  He is in need of fo's to help stabilize his ankles and help reduce supination.  For this, I have taken new biofoam impressions and will have new custom fo's fabricated for him with 1/8\" lateral wedges.  The molds have been sent to our main lab for fabrication.    P.  Pt. is scheduled to arrive back to our location in approx. 2 weeks for the fitting.  Pt. has been instructed to contact our facility with any future questions and/or concerns.    Good CAN/MICHAEL  "

## 2021-11-09 NOTE — PROGRESS NOTES
Matthew Ennis is a 20Y male RHD with genetically confirmed CMT1B here to transfer care.     History of present illness:   Having worsening pain in his right> left knee pain for the last few month. Denies pains in other parts of the leg. Feels sharp (electric shock?), that is a 2/10. The more active he is the worse it is. Occasionally will swell, impoved with tylenol/NSAID/wrapping. Denies injury or new activities.     Has weakness in his legs, right or worse than left, denies limiting his activities, but it is painful (muscle sore) afterwards. Denies weakness in his hands. Has some numbness/tingling in his feet, denies symptoms in hands (does have positional tingling). Has insoles but no orthotics in his shoes. Has some issues with tripping, but denies falls. Takes Cymbalta for tingling pain which is largely not helpul.     Has been having substernal chest pain with shortness of breath, happening occasionally and not necessarily associated with activity. Thinks it may occur daily, lasting for a few minutes. Thinks it may be tender to touch when symptomatic. Does get itchy all over occasionally. Denies DV, dysphagia.     Symptoms started in approx 0244-7397 with numbness/tingling in the feet. Noticed that when he was very young he could hold onto an electric fence/dog collar and not have significant pain. Symptoms have been progressive.      Previous work-up:     Genetic testing: heterozygous MPZ gene mutation (CMT1B)    EMG 2016: normal sensory and motor amplitudes with prolonged distal latencies. Slowed CV (Tibial,   Peroneal- approx 35m/s, normal UE) with normal needle study of left upper and lower.     PMH:   Past Medical History:   Diagnosis Date     ADHD (attention deficit hyperactivity disorder)      Asthma      Paresthesias     arms and legs   Concussion x2 - 2018 (LOC for seconds x1)    PSH:   Past Surgical History:   Procedure Laterality Date     DENTAL SURGERY       ELECTROMYOGRAM N/A 6/22/2016    Procedure:  ELECTROMYOGRAM;  Surgeon: Darwin Walls MD;  Location:  PEDS SEDATION      ENDOSCOPY         Social Hx:   Works in cafeteria  In school step program  Denies tobacco/alcohol/drug use    Family Hx:   Mother with same mutation, some tingling in the AM if sleeps wrong  Dad with knee pain, some tingling in the AM if sleeps wrong  1 brother (1yr older) without symptoms  2 maternal aunts asymptomatic (one seizure with history of seizure)  Maternal grandparents asymptomatic     Medications:   Current Outpatient Medications   Medication     albuterol (PROAIR HFA, PROVENTIL HFA, VENTOLIN HFA) 108 (90 BASE) MCG/ACT inhaler     albuterol (PROVENTIL) (5 MG/ML) 0.5% nebulizer solution     budesonide-formoterol (SYMBICORT) 160-4.5 MCG/ACT inhaler     cetirizine (ZYRTEC ALLERGY) 10 MG tablet     CLONAZEPAM PO     DULoxetine (CYMBALTA) 60 MG capsule     fluticasone (FLONASE) 50 MCG/ACT nasal spray     Methylphenidate HCl (METADATE CD PO)     Montelukast Sodium (SINGULAIR PO)     Multiple Vitamins-Minerals (MULTIVITAMIN GUMMIES ADULTS) CHEW     sodium fluoride (DENTA 5000 PLUS) 1.1 % CREA     Ascorbic Acid (VITAMIN C) 100 MG CHEW     budesonide-formoterol (SYMBICORT) 80-4.5 MCG/ACT inhaler     Multiple Vitamin (QUINTABS) TABS     No current facility-administered medications for this visit.       Physical Exam  /72   Pulse 71   Resp 12   Wt 60.8 kg (134 lb)   SpO2 99%   BMI 19.79 kg/m      MSK: tenderness to palpation over the right medial meniscal border. Some pain on testing of medial meniscus. Non-hypermobile knee cap compared to left. ACL intact. Some pain with log roll in the right hip. Hip abductors 4/5 bilaterally; no obvious hammertoes or high arches    Mental state: Alert, appropriate, speech, language, and thought content normal.     Cranial nerves II-XII normal.    Sensory examination:     Right Left   Light touch Normal Normal   Vibration (timed)     Vibration (Rydell-Seiffer) 7.5 8   Temp     Pin  Normal - increased sensation over feet Normal - increased sensation over feet   Pos intact intact   Legend:   MM = medial malleolus, TT = tibial tuberosity, K = patella, MCP = MCP joint  MF = mid-foot, DC = distal calf, MC = mid calf, PC = proximal calf      Motor examination:     Right Left   Shoulder abduction  5 5   Elbow extension 5 5   Elbow flexion 5 5   Wrist extension  5 5   Finger extension 5 5   FDI 5 4+   APB 4+ 5=4+   Hip flexion 5 5   Knee flexion 5 5   Knee extension 5 5   Dorsiflexion 5 5   Plantar flexion 5 5   A=atrophy    Tone normal     Reflexes:   Right Left   Biceps 2 2   BRD 2 2   Triceps 2 2   Segundo     Patellar 2 2   Achilles 1 1   Plantar Flexor Flexor   Clonus Absent Absent      Coordination:  Finger-nose normal.    Gait:  Normal.    Additional tests:  None    Impression:  Matthew Ennis is a 20Y male RHD with genetically confirmed CMT1B currently doing well. Discussed pathophysiology of CMT with patient and his mother. Discussed that this is a slowly progressive disease involving loss of muscle/sensation of the feet and hands. There are no disease-related treatments at this time, but symptomatic treatment in the mainstay. He has some complaints of weakness and numbness, but his exam is largely reassuring at this time. His largest concern of knee pain and given exam, concern for possible meniscal cause or irritation, which could be due to gait changes and hip weakness. Will benefit from further evaluation from physical therapy. Will have orthotist eval as well, but suspect given strength no need for AFOs at this time. Describes some tingling sensations in his feet, but believe that duloxetine is not helping. Could try drug holiday to assess efficacy given he has been on the medication for a long time. Titration as below. Unclear cause of chest pains at this time, but suspect likely related to acid reflux or costochondritis given clinical symptoms. Recommend track pains and discuss further  with PCM.     Recommendations:  - Discuss with PCP/ordering provider to trial drug holiday from Duloxetine   - PT/orthotist/ today  - could benefit from sports medicine doctor evaluation of knee  - Information from [facility] provided and medications to avoid  - RTC annually     Seen and discussed with Dr. Em. His addendum follows    Kirstin Dean MD  Neuromuscular Fellow      I personally examined the patient and concur with the resident's note. I personally reviewed the patient's prior EMG in 2016; it is notable for relatively mild conduction slowing. We discussed medication management as well.     Feliciano Em M.D.

## 2021-11-09 NOTE — PROGRESS NOTES
OUTPATIENT PHYSICAL THERAPY CLINIC NOTE  Noah Ennis  YOB: 2000  [id number]    Type of visit:         Evaluation & Treatment     Date of service: 2021    Referring provider: Dr. Em    Others present at visit:  Parent(s) - mother    Medical diagnosis:   CMT    Date of diagnosis:     Pertinent history of current problem (include personal factors and/or comorbidities that impact the plan of care): patient reports approximately 2 month history of right > left knee pain and occasional hip pain; states that it increases with activity. Patient denies any falls, does trip but able to maintain stability. PMHx: ADHD, concussion x 2. He attends school (step program) and works in the cafeteria at school; reports being very active with school athletics, biking, fishing, gardening, agate hunting    Living environment:  House    Living environment barriers:  4 stairs to enter (no railing)  15 stairs within home (1 railing present)     Current assistance/living environment:  Lives with parents      Current mobility equipment:  None     Current ADL equipment:  None    Technology used: cell phone, tablet, computer    Patient concerns/goals: right knee & bilateral hip pain for months    Evaluation   Interview completed.   Pain assessment:  Pain present  Location: knees/Rating: 3/10 at best; 6/10 at worst  Location: hips /Ratin/10 at best, 2/10 at worst     Range of motion: no limitations bilateral L/E; patient is hypermobile in terms of flexibility    Manual muscle testing: hip flex 5/5 bilaterally; knee ext 5/5 bilaterally; knee flex 5/5 on right, 4/5 on left (hamstring cramp during testing); ankle dorsiflexion 4+/5 bilaterally; ankle plantar flexion 4/5 bilaterally   Gait: independent with hiking boots; no gross gait deviations exhibited; good step symmetry & foot clearance bilaterally    25 ft timed walk: 10 steps in 4.37 seconds without boots; 9 steps in 4.29 seconds with boots    Fall Risk  Screen:   Has the patient fallen 2 or more times in the last year? No      Has the patient fallen and had an injury in the past year? No       Timed Up and Go Score: 5.28 seconds without boots; 4.93 seconds with boots    Is the patient a fall risk? Yes, department fall risk interventions implemented     Impairments:  Muscle atrophy  Pain     Treatment diagnosis:  Impaired mobility    Clinical Presentation: Evolving/Changing  Clinical Presentation Rationale: based upon subjective information provided, objective exam findings, medical history & clinical judgement   Clinical Decision Making (Complexity): Low complexity     Recommendations/Plan of care:  1 session evaluation & treatment.  Recommend referral to OP PT closer to home with an orthopedic clinical specialist to address knee pain.     Goals:   Target date: 11/9/21  Patient, family and/or caregiver will verbalize understanding of evaluation results and implications for functional performance.  Patient, family and/or caregiver will verbalize/demonstrate understanding of home program.    Educational assessment/barriers to learning:   ADHD - mom present to assist      Treatment provided this date:   Therapeutic procedures, 25 minutes    Response to treatment/recommendations:   Reviewed results of PT exam with patient and his mother. Provided education regarding bilateral hip abduction weakness and the possibility that the compensations he utilizes may be contributing to his knee pain. Discussed options (issue some strengthening exercise in clinic today or referral for OP PT with orthopedic specialist); patient and his mother would like to work with a PT closer to home for continued care to address pain issues. Requested referral from Dr. Em (goal attained). Provided education to patient and his mother in exercise considerations for patient's with CMT; discussed stretching vs strengthening vs conditioning. Encouraged patient to continue with his biking to promote  conditioning and educated regarding potential for fall risk if ankle strength declines. Provided education regarding obtaining bike stand to convert his bike into stationery bike if necessary. Patient and his mother verbalize understanding of education provided (goal attained). Instructed patient in standing gastroc stretch, standing soleus stretch & seated hamstring stretch. Demonstrated each stretch for patient & his mother. Issued written instructions with illustrations and then had patient return demonstration of each stretch to PT. By end of visit, patient was able to perform stretches correctly using illustrations & verbal cues from his mother (goal attained).     Goal attainment:  All goals met     Risks and benefits of evaluation/treatment have been explained.  Patient and his mother are in agreement with Plan of Care.     Timed Code Treatment Minutes: 25 min  Total Treatment Time (sum of timed and untimed services): 60 min    Signature: Vicky Terrazas, PT   Date: 11/9/2021    Certification:  Onset date: 11/8/2021 (referral)  Start of care date: 11/9/2021  Certification date from 11/9/2021 to 12/9/2021    I CERTIFY THE NEED FOR THESE SERVICES FURNISHED UNDER        THIS PLAN OF TREATMENT AND WHILE UNDER MY CARE     (Physician co-signature of this document indicates review and certification of the therapy plan).

## 2021-11-09 NOTE — LETTER
11/9/2021         RE: Noah Ennis  [address] [facility] [address]        Dear Colleague,    Thank you for referring your patient, Noah Ennis, to the [facility] NEUROLOGY CLINIC [place]. Please see a copy of my visit note below.    Matthew Ennis is a 20Y male RHD with genetically confirmed CMT1B here to transfer care.     History of present illness:   Having worsening pain in his right> left knee pain for the last few month. Denies pains in other parts of the leg. Feels sharp (electric shock?), that is a 2/10. The more active he is the worse it is. Occasionally will swell, impoved with tylenol/NSAID/wrapping. Denies injury or new activities.     Has weakness in his legs, right or worse than left, denies limiting his activities, but it is painful (muscle sore) afterwards. Denies weakness in his hands. Has some numbness/tingling in his feet, denies symptoms in hands (does have positional tingling). Has insoles but no orthotics in his shoes. Has some issues with tripping, but denies falls. Takes Cymbalta for tingling pain which is largely not helpul.     Has been having substernal chest pain with shortness of breath, happening occasionally and not necessarily associated with activity. Thinks it may occur daily, lasting for a few minutes. Thinks it may be tender to touch when symptomatic. Does get itchy all over occasionally. Denies DV, dysphagia.     Symptoms started in approx [id number] with numbness/tingling in the feet. Noticed that when he was very young he could hold onto an electric fence/dog collar and not have significant pain. Symptoms have been progressive.      Previous work-up:     Genetic testing: heterozygous MPZ gene mutation (CMT1B)    EMG 2016: normal sensory and motor amplitudes with prolonged distal latencies. Slowed CV (Tibial,   Peroneal- approx 35m/s, normal UE) with normal needle study of left upper and lower.     PMH:   Past Medical History:   Diagnosis Date     ADHD  (attention deficit hyperactivity disorder)      Asthma      Paresthesias     arms and legs   Concussion x2 - 2018 (LOC for seconds x1)    PSH:   Past Surgical History:   Procedure Laterality Date     DENTAL SURGERY       ELECTROMYOGRAM N/A 6/22/2016    Procedure: ELECTROMYOGRAM;  Surgeon: Darwin Walls MD;  Location:  PEDS SEDATION      ENDOSCOPY         Social Hx:   Works in [employer]  In school step program  Denies tobacco/alcohol/drug use    Family Hx:   Mother with same mutation, some tingling in the AM if sleeps wrong  Dad with knee pain, some tingling in the AM if sleeps wrong  1 brother (1yr older) without symptoms  2 maternal aunts asymptomatic (one seizure with history of seizure)  Maternal grandparents asymptomatic     Medications:   Current Outpatient Medications   Medication     albuterol (PROAIR HFA, PROVENTIL HFA, VENTOLIN HFA) 108 (90 BASE) MCG/ACT inhaler     albuterol (PROVENTIL) (5 MG/ML) 0.5% nebulizer solution     budesonide-formoterol (SYMBICORT) 160-4.5 MCG/ACT inhaler     cetirizine (ZYRTEC ALLERGY) 10 MG tablet     CLONAZEPAM PO     DULoxetine (CYMBALTA) 60 MG capsule     fluticasone (FLONASE) 50 MCG/ACT nasal spray     Methylphenidate HCl (METADATE CD PO)     Montelukast Sodium (SINGULAIR PO)     Multiple Vitamins-Minerals (MULTIVITAMIN GUMMIES ADULTS) CHEW     sodium fluoride (DENTA 5000 PLUS) 1.1 % CREA     Ascorbic Acid (VITAMIN C) 100 MG CHEW     budesonide-formoterol (SYMBICORT) 80-4.5 MCG/ACT inhaler     Multiple Vitamin (QUINTABS) TABS     No current facility-administered medications for this visit.       Physical Exam  /72   Pulse 71   Resp 12   Wt 60.8 kg (134 lb)   SpO2 99%   BMI 19.79 kg/m      MSK: tenderness to palpation over the right medial meniscal border. Some pain on testing of medial meniscus. Non-hypermobile knee cap compared to left. ACL intact. Some pain with log roll in the right hip. Hip abductors 4/5 bilaterally; no obvious hammertoes or high  [facility]    Mental state: Alert, appropriate, speech, language, and thought content normal.     Cranial nerves II-XII normal.    Sensory examination:     Right Left   Light touch Normal Normal   Vibration (timed)     Vibration (Rydell-Seiffer) 7.5 8   Temp     Pin Normal - increased sensation over feet Normal - increased sensation over feet   Pos intact intact   Legend:   MM = medial malleolus, TT = tibial tuberosity, K = patella, MCP = MCP joint  MF = mid-foot, DC = distal calf, MC = mid calf, PC = proximal calf      Motor examination:     Right Left   Shoulder abduction  5 5   Elbow extension 5 5   Elbow flexion 5 5   Wrist extension  5 5   Finger extension 5 5   FDI 5 4+   APB 4+ 5=4+   Hip flexion 5 5   Knee flexion 5 5   Knee extension 5 5   Dorsiflexion 5 5   Plantar flexion 5 5   A=atrophy    Tone normal     Reflexes:   Right Left   Biceps 2 2   BRD 2 2   Triceps 2 2   Segundo     Patellar 2 2   Achilles 1 1   Plantar Flexor Flexor   Clonus Absent Absent      Coordination:  Finger-nose normal.    Gait:  Normal.    Additional tests:  None    Impression:  Matthew Ennis is a 20Y male RHD with genetically confirmed CMT1B currently doing well. Discussed pathophysiology of CMT with patient and his mother. Discussed that this is a slowly progressive disease involving loss of muscle/sensation of the feet and hands. There are no disease-related treatments at this time, but symptomatic treatment in the mainstay. He has some complaints of weakness and numbness, but his exam is largely reassuring at this time. His largest concern of knee pain and given exam, concern for possible meniscal cause or irritation, which could be due to gait changes and hip weakness. Will benefit from further evaluation from physical therapy. Will have orthotist eval as well, but suspect given strength no need for AFOs at this time. Describes some tingling sensations in his feet, but believe that duloxetine is not helping. Could try drug holiday to  assess efficacy given he has been on the medication for a long time. Titration as below. Unclear cause of chest pains at this time, but suspect likely related to acid reflux or costochondritis given clinical symptoms. Recommend track pains and discuss further with PCM.     Recommendations:  - Discuss with PCP/ordering provider to trial drug holiday from Duloxetine   - PT/orthotist/ today  - could benefit from sports medicine doctor evaluation of knee  - Information from [facility] provided and medications to avoid  - RTC annually     Seen and discussed with Dr. Em. His addendum follows    Kirstin Dean MD  Neuromuscular Fellow      I personally examined the patient and concur with the resident's note. I personally reviewed the patient's prior EMG in 2016; it is notable for relatively mild conduction slowing. We discussed medication management as well.     Feliciano Em M.D.                Again, thank you for allowing me to participate in the care of your patient.        Sincerely,        Feliciano Em MD

## 2021-11-09 NOTE — Clinical Note
Hi Dr. Em,     Apologies as I know you recently signed this order already, but got a request from orthotics dept to add a couple more diagnoses for insurance purposes. I have added supination (M21.6X1, M21.6X2) and ankle instability in addition to CMT. Please sign pended order when able. Thanks!

## 2021-11-16 ENCOUNTER — TELEPHONE (OUTPATIENT)
Dept: CONSULT | Facility: CLINIC | Age: 21
End: 2021-11-16
Payer: COMMERCIAL

## 2021-11-16 NOTE — TELEPHONE ENCOUNTER
I called Noah and his mother, Ingrid. I left a voicemail on Ingrid's phone saying I was calling to check in to see if they had any other questions about Noah's updated genetic test report. I know he saw Dr. Em in the CMT clinic last week and wanted to make sure they have no other questions about the now positive genetic test results or testing for other family members. I also said I could mail them copies of the updated reports if they have not already received them. I asked Ingrid to call me back.     Evangelina Bermudez MS, [facility]  Licensed Genetic Counselor  [facility]  Phone: [phone]  Fax: [phone]

## 2021-11-18 ENCOUNTER — TRANSFERRED RECORDS (OUTPATIENT)
Dept: HEALTH INFORMATION MANAGEMENT | Facility: CLINIC | Age: 21
End: 2021-11-18
Payer: COMMERCIAL

## 2022-03-21 ENCOUNTER — OFFICE VISIT (OUTPATIENT)
Dept: NEUROLOGY | Facility: CLINIC | Age: 22
End: 2022-03-21
Payer: COMMERCIAL

## 2022-03-21 DIAGNOSIS — G60.0 CMT (CHARCOT-MARIE-TOOTH DISEASE): Primary | ICD-10-CM

## 2022-03-21 PROCEDURE — 95885 MUSC TST DONE W/NERV TST LIM: CPT | Performed by: PSYCHIATRY & NEUROLOGY

## 2022-03-21 PROCEDURE — 95911 NRV CNDJ TEST 9-10 STUDIES: CPT | Performed by: PSYCHIATRY & NEUROLOGY

## 2022-03-21 NOTE — PROGRESS NOTES
[facility]   EMG Laboratory      Nerve Conduction & EMG Report          Patient:       Noah Ennis  Patient ID:    [id number]  Gender:        Male  YOB: 2000  Age:           21 Years 3 Months      History and Examination:  Noah Ennis is a 21 year old man with a pathogenic MPZ mutation and acral sensory and motor symptoms. He is referred to better establish his electrophysiologic phenotype.     Techniques:  Motor conduction studies were done with surface recording electrodes. Sensory conduction studies were performed with surface electrodes, unless indicated otherwise by (n), designating the use of subdermal recording electrodes. Temperature was monitored and recorded throughout the study. Upper extremities were maintained at a temperature of 32 degrees Centigrade or higher.  Lower extremities were maintained at a temperature of 31 degrees Centigrade or higher. EMG was done with a concentric needle electrode.     Results:  Right superficial peroneal, radial, and ulnar sensory conduction studies demonstrated mild slowing of conduction and were otherwise normal. Right sural and median sensory conduction studies were normal. A right peroneal motor conduction study demonstrated mild slowing of conduction in the calf. A right tibial motor conduction study was normal. A right median motor conduction study demonstrated mild prolongation of distal latenvcy and mild slowing of conduction in the forearm segment. A right ulnar motor conduction study demonstrated mild prolongation of distal latency, minimal slowing of conduction in the forearm, and moderate slowing of conduction across the elbow. Right ulnar and tibial minimum F-response latencies were mildly to moderately prolonged, with normal chronodispersion and F-persistence. Electromyography of selected muscles was normal.     Interpretation:  This is an abnormal study, demonstrating electrophysiologic evidence of the followin.  Mild sensorimotor polyneuropathy. See comments.  2. Mild to moderate right ulnar neuropathy at the elbow.   3. Normal limited electromyography.    Comment:  The findings are non-length dependent. The findings are all most consistent with a disorder of myelin but latencies and conduction velocities are not in the demyelinating range.    Feliciano Em MD        Sensory NCS      Nerve / Sites Rec. Site Onset Peak NP Amp Ref. PP Amp Dist Kwasi Ref. Temp     ms ms  V  V  V cm m/s m/s  C   R MEDIAN - Dig II Anti      Wrist Dig II 2.71 3.80 24.2 10.0 47.5 14 51.7 48.0 32.7   R ULNAR - Dig V Anti      Wrist Dig V 2.86 3.75 28.9 8.0 50.5 12.5 43.6 48.0 32.7   R RADIAL - Snuff      Forearm Snuff 2.60 3.54 18.9 15.0 26.3 11 42.2 48.0 32   R SURAL - Lat Mall      Calf Ankle 3.13 4.32 18.3 8.0 15.5 14 44.8 38.0 31.7   R SUP PERONEAL      Lat Leg Ramos 3.39 4.17 7.1  8.0 12.5 36.9 38.0 32.5       Motor NCS      Nerve / Sites Rec. Site Lat Ref. Amp Ref. Rel Amp Dist Kwasi Ref. Dur. Area Temp.     ms ms mV mV % cm m/s m/s ms %  C   R MEDIAN - APB      Wrist APB 5.31 4.40 6.5 5.0 100 8   7.03 100 32.4      Elbow APB 10.99  5.2  80.2 25 44.0 48.0 8.75 90.6 32.2   R ULNAR - ADM      Wrist ADM 3.80 3.50 13.0 5.0 100 8   8.02 100 32.6      B.Elbow ADM 8.23  12.5  95.9 21 47.4 48.0 8.59 102 33      A.Elbow ADM 10.83  12.4  94.8 9 34.6 48.0 8.49 103 33.1   R DEEP PERONEAL - EDB      Ankle EDB 4.64 6.00 7.5 2.5 100 8   7.14 100 32      FibHead EDB 13.91  6.9  91.7 34 36.7 38.0 8.18 93.9 31.9      Pop Fos EDB 16.35  6.3  84.2 10 40.9 38.0 8.18 83.8 31.9   R TIBIAL - AH      Ankle AH 5.52 6.00 11.7 4.0 100 8   8.54 100 31.3      Pop Fos AH 15.78  5.6  48.1 39 38.0 38.0 11.61 52.6 31.2       F  Wave      Nerve Min F Lat Max F Lat Mean FLat Temp.    ms ms ms  C   R TIBIAL 71.41 77.50 74.08 32.4   R ULNAR 38.28 41.35 39.53 34.8       EMG Summary Table     Spontaneous MUAP Recruitment    IA Fib PSW Fasc H.F. Amp Dur. PPP Pattern   R. TIB ANTERIOR N  None None None None N N N N   R. VAST LATERALIS N None None None None N N N N   R. FIRST D INTEROSS N None None None None N N N N   R. DELTOID N None None None None N N N N

## 2022-03-21 NOTE — LETTER
3/21/2022         RE: Noah Ennis  [address]  [facility] [address]        Dear Colleague,    Thank you for referring your patient, Noah Ennis, to the [facility] NEUROLOGY CLINIC [place]. Please see a copy of my visit note below.      [facility]   EMG Laboratory      Nerve Conduction & EMG Report          Patient:       Noah Ennis  Patient ID:    [id number]  Gender:        Male  YOB: 2000  Age:           21 Years 3 Months      History and Examination:  Noah Ennis is a 21 year old man with a pathogenic MPZ mutation and acral sensory and motor symptoms. He is referred to better establish his electrophysiologic phenotype.     Techniques:  Motor conduction studies were done with surface recording electrodes. Sensory conduction studies were performed with surface electrodes, unless indicated otherwise by (n), designating the use of subdermal recording electrodes. Temperature was monitored and recorded throughout the study. Upper extremities were maintained at a temperature of 32 degrees Centigrade or higher.  Lower extremities were maintained at a temperature of 31 degrees Centigrade or higher. EMG was done with a concentric needle electrode.     Results:  Right superficial peroneal, radial, and ulnar sensory conduction studies demonstrated mild slowing of conduction and were otherwise normal. Right sural and median sensory conduction studies were normal. A right peroneal motor conduction study demonstrated mild slowing of conduction in the calf. A right tibial motor conduction study was normal. A right median motor conduction study demonstrated mild prolongation of distal latenvcy and mild slowing of conduction in the forearm segment. A right ulnar motor conduction study demonstrated mild prolongation of distal latency, minimal slowing of conduction in the forearm, and moderate slowing of conduction across the elbow. Right ulnar and tibial minimum  F-response latencies were mildly to moderately prolonged, with normal chronodispersion and F-persistence. Electromyography of selected muscles was normal.     Interpretation:  This is an abnormal study, demonstrating electrophysiologic evidence of the followin. Mild sensorimotor polyneuropathy. See comments.  2. Mild to moderate right ulnar neuropathy at the elbow.   3. Normal limited electromyography.    Comment:  The findings are non-length dependent. The findings are all most consistent with a disorder of myelin but latencies and conduction velocities are not in the demyelinating range.    Feliciano Em MD        Sensory NCS      Nerve / Sites Rec. Site Onset Peak NP Amp Ref. PP Amp Dist Kwasi Ref. Temp     ms ms  V  V  V cm m/s m/s  C   R MEDIAN - Dig II Anti      Wrist Dig II 2.71 3.80 24.2 10.0 47.5 14 51.7 48.0 32.7   R ULNAR - Dig V Anti      Wrist Dig V 2.86 3.75 28.9 8.0 50.5 12.5 43.6 48.0 32.7   R RADIAL - Snuff      Forearm Snuff 2.60 3.54 18.9 15.0 26.3 11 42.2 48.0 32   R SURAL - Lat Mall      Calf Ankle 3.13 4.32 18.3 8.0 15.5 14 44.8 38.0 31.7   R SUP PERONEAL      Lat Leg Ramos 3.39 4.17 7.1  8.0 12.5 36.9 38.0 32.5       Motor NCS      Nerve / Sites Rec. Site Lat Ref. Amp Ref. Rel Amp Dist Kwasi Ref. Dur. Area Temp.     ms ms mV mV % cm m/s m/s ms %  C   R MEDIAN - APB      Wrist APB 5.31 4.40 6.5 5.0 100 8   7.03 100 32.4      Elbow APB 10.99  5.2  80.2 25 44.0 48.0 8.75 90.6 32.2   R ULNAR - ADM      Wrist ADM 3.80 3.50 13.0 5.0 100 8   8.02 100 32.6      B.Elbow ADM 8.23  12.5  95.9 21 47.4 48.0 8.59 102 33      A.Elbow ADM 10.83  12.4  94.8 9 34.6 48.0 8.49 103 33.1   R DEEP PERONEAL - EDB      Ankle EDB 4.64 6.00 7.5 2.5 100 8   7.14 100 32      FibHead EDB 13.91  6.9  91.7 34 36.7 38.0 8.18 93.9 31.9      Pop Fos EDB 16.35  6.3  84.2 10 40.9 38.0 8.18 83.8 31.9   R TIBIAL - AH      Ankle AH 5.52 6.00 11.7 4.0 100 8   8.54 100 31.3      Pop Fos AH 15.78  5.6  48.1 39 38.0 38.0 11.61 52.6 31.2        F  Wave      Nerve Min F Lat Max F Lat Mean FLat Temp.    ms ms ms  C   R TIBIAL 71.41 77.50 74.08 32.4   R ULNAR 38.28 41.35 39.53 34.8       EMG Summary Table     Spontaneous MUAP Recruitment    IA Fib PSW Fasc H.F. Amp Dur. PPP Pattern   R. TIB ANTERIOR N None None None None N N N N   R. VAST LATERALIS N None None None None N N N N   R. FIRST D INTEROSS N None None None None N N N N   R. DELTOID N None None None None N N N N                                    Again, thank you for allowing me to participate in the care of your patient.        Sincerely,        Feliciano Em MD

## 2022-05-16 ENCOUNTER — HEALTH MAINTENANCE LETTER (OUTPATIENT)
Age: 22
End: 2022-05-16

## 2022-09-11 ENCOUNTER — HEALTH MAINTENANCE LETTER (OUTPATIENT)
Age: 22
End: 2022-09-11

## 2022-09-29 ENCOUNTER — TELEPHONE (OUTPATIENT)
Dept: NEUROLOGY | Facility: CLINIC | Age: 22
End: 2022-09-29

## 2022-09-29 NOTE — TELEPHONE ENCOUNTER
Left Voicemail (1st Attempt) for the patient to call back and schedule the following:    Appointment type: [facility] Return  Provider: Dr. Em  Return date: 12/13/2022 or 1/10/2023  Specialty phone number: [phone]  Additional appointment(s) needed: none  Additonal Notes: Need to reschedule the appointment with Dr. Em on 11/8 to either 12/13 or 1/10, [facility] Return [facility].    Template change does not allow for double booking, as there isn't a Fellow working with Dr. Em.    [facility]   Neurology, NeuroSurgery, NeuroPsychology and Pain Management Specialties  [phone]

## 2022-12-06 ENCOUNTER — PRE VISIT (OUTPATIENT)
Dept: NEUROLOGY | Facility: CLINIC | Age: 22
End: 2022-12-06

## 2022-12-06 NOTE — TELEPHONE ENCOUNTER
Writer left detailed message requesting patient call back to confirm his CMT appt on 12/13/22 and go over previsit questions.     Cee Benavides, RNCC  Neurology/Neurosurgery/PM&R

## 2023-03-07 ENCOUNTER — PRE VISIT (OUTPATIENT)
Dept: NEUROLOGY | Facility: CLINIC | Age: 23
End: 2023-03-07
Payer: COMMERCIAL

## 2023-03-07 DIAGNOSIS — G60.0 CMT (CHARCOT-MARIE-TOOTH DISEASE): Primary | ICD-10-CM

## 2023-03-07 NOTE — TELEPHONE ENCOUNTER
"CMT RETURN PATIENT    Are their any goals or new issues you would like to address at this appt? Increased pain in his lower legs     How has your mobility been since the last office visit? Some balance concerns but no falls  Have you had an increase in falls or any mobility concerns? none  Do you have any assistive devices (cane, walker, wheelchair) or are you interested in finding out more about how to obtain these? none    Any concerns about hand weakness or pain? None                  Do you have trouble doing things around the house such as opening jars, zipping/buttoning, writing or other activities of daily living?     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 Yes, mother has some questions for genetic counselor     Do you wear braces such as foot orthotics or AFOs? If so, how are these working for you? If not, is this something you would like to discuss?  Inserts, would like to follow-up with orthotist      We will have a  available in clinic in case you have any questions. They can help connect you with community resources and discuss disability. none    There will also be a representative from the [facility] here in clinic. n/a    To determine if you qualify for any CMT research studies, would you be interested in meeting with the research coordinator? Yes, if appropriate                Verify medications and allergies.     The patient will be scheduled to see: PT, orthotics, research if appropriate, genetic counseling     \"We will contact you once the schedule has been completed to let you know what time you need to arrive. Disregard the automated appointment reminder call, I will call you directly to let you know what time to be here.\"    Cee Benavides, ERASMO  Neurology/Neurosurgery     "

## 2023-03-14 ENCOUNTER — OFFICE VISIT (OUTPATIENT)
Dept: NEUROLOGY | Facility: CLINIC | Age: 23
End: 2023-03-14
Payer: COMMERCIAL

## 2023-03-14 ENCOUNTER — HOSPITAL ENCOUNTER (OUTPATIENT)
Dept: PHYSICAL THERAPY | Facility: CLINIC | Age: 23
Setting detail: THERAPIES SERIES
Discharge: HOME | End: 2023-03-14
Attending: PSYCHIATRY & NEUROLOGY
Payer: COMMERCIAL

## 2023-03-14 ENCOUNTER — DOCUMENTATION ONLY (OUTPATIENT)
Dept: ORTHOPEDICS | Facility: CLINIC | Age: 23
End: 2023-03-14

## 2023-03-14 VITALS
HEART RATE: 77 BPM | BODY MASS INDEX: 19.79 KG/M2 | DIASTOLIC BLOOD PRESSURE: 70 MMHG | SYSTOLIC BLOOD PRESSURE: 110 MMHG | WEIGHT: 134 LBS

## 2023-03-14 DIAGNOSIS — Z71.83 ENCOUNTER FOR NONPROCREATIVE GENETIC COUNSELING: ICD-10-CM

## 2023-03-14 DIAGNOSIS — G60.0 CMT (CHARCOT-MARIE-TOOTH DISEASE): Primary | ICD-10-CM

## 2023-03-14 PROCEDURE — 97110 THERAPEUTIC EXERCISES: CPT | Mod: GP | Performed by: PHYSICAL THERAPIST

## 2023-03-14 PROCEDURE — 97161 PT EVAL LOW COMPLEX 20 MIN: CPT | Mod: GP | Performed by: PHYSICAL THERAPIST

## 2023-03-14 PROCEDURE — 99213 OFFICE O/P EST LOW 20 MIN: CPT | Performed by: PSYCHIATRY & NEUROLOGY

## 2023-03-14 PROCEDURE — 96040 HC GENETIC COUNSELING, EACH 30 MINUTES: CPT | Performed by: PHYSICAL THERAPIST

## 2023-03-14 NOTE — PROGRESS NOTES
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 He reports no functional deficits. He does report pain in the right knee with activity and standing. He has seen an orthopedist for this before.             Examination    No swelling, tenderness, instability, redness at right knee.    Calluses soles of left foot.      Mental state: Alert, appropriate, speech, language, and thought content normal.     Cranial nerves II-XII normal.    Sensory examination:     Right Left   Light touch Normal Normal   Vibration (timed)     Vibration (Rydell-Seiffer) GT7 GT8   Temp     Pin Normal Normal   Pos     Legend:   MM = medial malleolus, TT = tibial tuberosity, K = patella, MCP = MCP joint  MF = mid-foot, DC = distal calf, MC = mid calf, PC = proximal calf      Motor examination:     Right Left   Shoulder abduction  5 5   Elbow extension 5 5   Elbow flexion 5 5   Wrist extension  5 5   Finger extension 5 5   FDI 5 5   APB 4+ 5   Hip flexion 5 5   Knee flexion 5 5   Knee extension 5 5   Dorsiflexion 5 5   Plantar flexion 5 5   A=atrophy    Tone normal        Gait:  Independent      Impression:  MPZ pathogenic mutation, minimal findings    Recommendations:  Follow up with orthopedics and podiatry  Reference PIGD; patient to meet with GC today as well  RTC 1 year      Feliciano Em M.D.      25 minutes spent on the date of the encounter on chart review, history and examination, documentation and further activities as noted above.

## 2023-03-14 NOTE — PROGRESS NOTES
S. Pt. was seen today, at Dr. Em's [facility] clinic in [place], for an evaluation for lower extremity bracing.    O.Goal. To help stabilize ankles and give proper support.    A. Pt. currently has custom made FO's that had worked for him well in the past but is due to new orthotics due to general wear. His current fo's are appox. 1 year old. He is an active 22 year old male that wears hiking boots for extra support. He is in need of fo's to help stabilize his ankles.  He is starting to have bilateral medial knee pain. For this, I have taken new biofoam impressions and will have new custom fo's fabricated for him. The impressions have been sent to our main lab for fabrication.  I have requested an Rx. from Dr. Em.    P. Pt. is scheduled to arrive back to our location in approx. 2 weeks for the fitting. Pt. has been instructed to contact our facility with any future questions and/or concerns.    Good CAN/MICHAEL

## 2023-03-14 NOTE — PATIENT INSTRUCTIONS
1.) Please reach out to Dr. Santiago about your knee concerns  2.) Follow-up with your podiatrist as discussed  3.) 1 year [facility] follow-up scheduled for 2/13/24 at 10:30am. We will be in touch closer to this date to remind you of the appointment. Thank you!

## 2023-03-14 NOTE — LETTER
3/14/2023         RE: Noah Ennis  [address]  [place] [place] [facility] [address]        Dear Colleague,    Thank you for referring your patient, Noah Ennis, to the [facility] [place]. Please see a copy of my visit note below.    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 He reports no functional deficits. He does report pain in the right knee with activity and standing. He has seen an orthopedist for this before.             Examination    No swelling, tenderness, instability, redness at right knee.    Calluses soles of left foot.      Mental state: Alert, appropriate, speech, language, and thought content normal.     Cranial nerves II-XII normal.    Sensory examination:     Right Left   Light touch Normal Normal   Vibration (timed)     Vibration (Rydell-Seiffer) GT7 GT8   Temp     Pin Normal Normal   Pos     Legend:   MM = medial malleolus, TT = tibial tuberosity, K = patella, MCP = MCP joint  MF = mid-foot, DC = distal calf, MC = mid calf, PC = proximal calf      Motor examination:     Right Left   Shoulder abduction  5 5   Elbow extension 5 5   Elbow flexion 5 5   Wrist extension  5 5   Finger extension 5 5   FDI 5 5   APB 4+ 5   Hip flexion 5 5   Knee flexion 5 5   Knee extension 5 5   Dorsiflexion 5 5   Plantar flexion 5 5   A=atrophy    Tone normal        Gait:  Independent      Impression:  MPZ pathogenic mutation, minimal findings    Recommendations:  Follow up with orthopedics and podiatry  Reference PIGD; patient to meet with GC today as well  RTC 1 year      Feliciano Em M.D.      25 minutes spent on the date of the encounter on chart review, history and examination, documentation and further activities as noted above.            Again, thank you for allowing me to participate in the care of your patient.        Sincerely,        Feliciano Em MD     (Patient) has been notified, and understood.  Pt was transferred over to scheduling.

## 2023-03-14 NOTE — PROGRESS NOTES
[facility]    OUTPATIENT PHYSICAL THERAPY CLINIC NOTE  Noah Ennis  YOB: 2000  [id number]    Type of visit:         Evaluation & Treatment     Date of service: 3/14/2023    Referring provider: Dr. Em    Others present at visit:  Parent(s) - mother    Medical diagnosis:   CMT    Date of diagnosis:     Pertinent history of current problem (include personal factors and/or comorbidities that impact the plan of care): patient returns for annual visit at CMT clinic; referred to PT due to increased right knee pain. PMHx: ADHD; concussion x 2    Living environment:  House    Living environment barriers:  4 stairs to enter (no railing present)  18 stairs within home (1 railing present)     Current assistance/living environment:  Lives with parents      Current mobility equipment:  Orthotics: bilateral foot orthoses     Current ADL equipment:  None    Technology used: cell phone, computer, tablet    Evaluation   Interview completed.   Pain assessment:  Location: right knee/Ratin/10 at best, 3/10 at worst     Range of motion: patient is hyperflexible     Manual muscle testing: hip flex 5/5 bilaterally; knee ext 4+5 on right (with pain), 5/5 on left; knee flex 5/5 bilaterally; ankle dorsiflexion 4+/5 bilaterally; ankle plantar flexion 4/5 on right, 4-/5 on left   Gait: independent without device; no gross gait deviations exhibited, good foot clearance & step symmetry bilaterally    25 ft timed walk: 10 steps in 5.29 seconds without device    Fall Risk Screen:   Has the patient fallen 2 or more times in the last year? No      Has the patient fallen and had an injury in the past year? No       Timed Up and Go Score: 6.28 seconds without device    Is the patient a fall risk? No     Impairments:  Muscle atrophy  Pain     Treatment diagnosis:  Impaired mobility    Clinical Presentation:  Evolving/Changing  Clinical Presentation Rationale: based upon subjective information provided, objective exam findings, medical history & clinical judgement   Clinical Decision Making (Complexity): Low complexity     Recommendations/Plan of care:  1 session evaluation & treatment.     Goals:   Target date: 3/14/2023  Patient and his mother will verbalize understanding of evaluation results and implications for functional performance.  Patient and his mother will verbalize/demonstrate understanding of home program.    Educational assessment/barriers to learning:   No barriers noted     Treatment provided this date:   Therapeutic procedures, 10 minutes    Response to treatment/recommendations:   Reviewed results of PT exam with patient and his mother. Changes noted: decreased strength right knee ext, left ankle plantar flexion; slower times (by approx 1 second) for both TUG & 25 ft timed walk. Instructed in seated hamstring stretches in long sit position at edge of bed to provide stability to L/E & with ability to assess spine position. Demonstrated stretch to patient, issued written instructions with illustrations & had patient return demonstration of the stretch to PT. Able to perform with correct technique with cues from his mother. Instructed in repetitive sit to stand without U/E support from higher surface level for closed chain strengthening of right quads. Demonstrated exercise to patient, issued written instructions with illustrations & had patient return demonstration of exercise to PT - demonstrates with correct technique.     Goal attainment:  All goals met     Risks and benefits of evaluation/treatment have been explained.  Patient and his mother are in agreement with Plan of Care.     Timed Code Treatment Minutes: 10 min  Total Treatment Time (sum of timed and untimed services): 35 min    Signature: Vicky Terrazas, PT   Date: 3/14/2023    Certification:  Onset date: 3/7/2023  Start of care date:  3/14/2023  Certification date from 3/14/2023 to 3/31/2023    I CERTIFY THE NEED FOR THESE SERVICES FURNISHED UNDER        THIS PLAN OF TREATMENT AND WHILE UNDER MY CARE     (Physician co-signature of this document indicates review and certification of the therapy plan).

## 2023-03-14 NOTE — LETTER
3/14/2023         RE: Noah Ennis  [address]  [facility] [address]        Dear Colleague,    Thank you for referring your patient, Noah Ennis, to the [facility] [place]. Please see a copy of my visit note below.    GENETIC COUNSELING CONSULTATION NOTE    Date of visit: 03/14/23    Presenting Information:   Noah Ennis is a 22 year old male referred to the [facility] CMT [facility] due to MPZ-related CMT1B. He was seen for a genetic counseling appointment in coordination with Dr. Em today. He was accompanied by his mother, Ingrid.    I previously met Noah and his mother in 2020 for a virtual visit after Noah had a visit with Dr. Walls in our Neuromuscular clinic due to his history of hereditary sensory and motor peripheral neuropathy. He first started noticing symptoms when he was about 13-14 years old and reports that his symptoms of back pain, fatigue, and tingling in his hands and feet have gotten progressively worse. He reports that his walking is getting worse as well and reports that his legs get tired going up stairs. Noah has been followed by Dr. Walls for several years for his neuropathy. 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     Noah's other notable medical history includes diagnoses of ADHD, asthma, allergies, mild autism spectrum disorder, and carpal tunnel. He wears glasses and sees and optometrist annually. Noah denies any hearing loss, seizures, GI problems, and cardiac and respiratory concerns.    I coordinated genetic testing for Noah in 2020 which identified a variant of uncertain significance in the MPZ gene which was reclassified to pathogenic in 2021. 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 Noah is now followed by Dr. Em in the   [facility] CMT clinic at [place]. I had the pleasure of meeting with Noah and his mother today at a follow-up visit with Dr. Em to review the genetic details of CMT1B and address their genetic questions.     Please refer to Dr. Em's note from today for further details of Noah's medical history and evaluation from today.     Family History: A three generation pedigree was obtained 10/28/20 and scanned into the electronic medical record. The history was updated today and relevant portions are described below:    Siblings-   24 year old brother who has Asperger's and is otherwise heathy.  He has not had genetic testing  Parents-   Mother, Ingrid, is 43 years old. She has a history of endometriosis. She had genetic testing which was positive for the MPZ variant. She recently thought she was developing carpal tunnel but her orthopedic provider said it was more extensive than carpal tunnel so they think this might be early signs of CMT for Ingrid.   Father, Johnnie, is 45 years old. He has a history of numbness and tingling and possible lower extremity neuropathy since his 20's. He also has carpal tunnel and degenerative disk disease. He has not had a Neurology evaluation for his neuropathy. His genetic testing for the MPZ variant was negative.  Maternal Relatives-   Two maternal aunts. One aunt has a history of seizures in adulthood which are well controlled on medication. She has a 22 year old son and 17 year old daughter who are healthy. The other aunt has a history of thyroid problems.   Maternal grandmother has high cholesterol and is otherwise healthy.  Maternal grandfather has a history of high cholesterol and has a pacemaker placed.   Paternal Relatives-   One paternal uncle and two paternal aunts. His uncle is alive and well, as are his children. One of his aunts has thyroid problems and skin cancer. She has a daughter with autism and diabetes and her other children are healthy. The other  aunt is alive and well, as are her children.   Paternal grandmother has a history of diabetes, thyroid problems, and skin cancer.   Paternal grandfather had a history of carpal tunnel and passed away at age 59 due to congestive heart failure.      Family history is otherwise largely non-contributory. Maternal ancestry is [place]orean, Bengali, and [place]ian and paternal ancestry is Bengali, [place], [place], and English. Consanguinity was denied.     Previous Genetic Testin16- PMP22 deletion/duplication analysis: negative (2 copies of PMP22)  16- CMT1, dominant intermediate, recessive intermediate CMT Panel (27 genes):   MPZ c.646-3C>G (intron 5)  Variant of uncertain significance  11/15/20 (Adended 21) [facility] Comprehensive Neuropathies Panel (81 genes): POSITIVE  MPZ c.646-3C>G (intronic) Pathogenic variant    Genetic Counseling Discussion:  For review, our bodies are made of cells that contain our chromosomes which are made up of long stretches of DNA containing our genes. Our genes serve as the instructions for our bodies to grow and function. We have two copies of each gene, one inherited from our mother and one inherited from our father.     Noah's genetic testing identified a pathogenic variant in the MPZ gene called c.646-3C>G. This finding is consistent with a diagnosis of autosomal dominant MPZ-related disorder, with his presentation being most consistent with CMT1B.    Clinical presentation ot Charcot Olya Tooth disease  Charcot-Olya-Tooth Neuropathy (CMT) is common genetic form of peripheral neuropathy affecting 1 in 2500 individuals. In general, CMT is a condition that affects the peripheral nerves (the nerves outside of your brain and spinal cord) that lead to symptoms related to muscle movement and sensation. There are many different types of CMT. These types are labeled with numbers (1,2,4 etc). Some types of CMT damage the myelin that coats our nerves which controls the speed of  the message that is sent, some damage a part of the nerves called the axons which carry the messages and control the strength of the message that is sent, and some cause damage to both parts of the nerve.This is determined based on the results of the nerve conduction studies. CMT Type 1 is the demyelinating form of the condition.  Demyelinating  means that this type of CMT causes the covering around the nerve (myelin) to form incorrectly. When the myelin is not formed the messages cannot travel the whole length of the nerve and they do not travel at the speed that they would in an individual who does not have CMT. CMT Type 2 is the axonal form of the condition.  Axonal  means that the axon or the middle part of the nerve is damaged and the messages being sent down the nerves are not clear or accurate. This leads to the muscles not being able to understand the garbled message that is being sent. There are also some less common types of CMT (3, 4) that have a mixture of these axonal and neuronal symptoms. Additionally, there are many subtypes of CMT within each type. These subtypes are labeled with letters (A, B, C etc) and can only be determined through genetic testing.     All forms of CMT cause damage to two types of the nerves, the motor nerves (involved in muscle movement) and the sensory nerves (involved in sensation or feeling). Damage to motor nerves causes muscle weakness and difficulty with muscle movement, especially in the hands and feet. This can lead to difficulty walking, writing, putting on jewelry and many other types of movement. Damage to the sensory nerves can cause numbness, tingling and impaired balance, which can lead to fatigue.    CMT is highly variable, even within members of the same family. Individuals may not experience any symptoms, these symptoms may be mild, or they might begin as mild and gradually become more severe. Some individuals might experience more muscle movement related  symptoms, while others might experience more difficulties with loss of sensation. Additionally, if a particular genetic change can cause multiple types of CMT, it is possible for individuals in the same family to have a different type of CMT. This is rare, but may occur in some cases.    Pathogenic variants in the MPZ gene have been associated with several different clinical pictures (phenotypes) which are described in detail below.    CMT1B  CMT1B is the fourth most common type of CMT. This type of CMT is demyelinating, meaning it causes damage to the myelin part of the nerve or the covering that surrounds the nerve. The age when symptoms begin is different in different people with this condition. About 40% of individuals with CMT1B begin having symptoms before age 5. This may impact their ability to learn to walk. In addition to the features of CMT described above, these individuals may also have a misalignment of their hip joint during development called hip dysplasia, damage to the optic nerve which is the nerve that connects the eye to the brain and an atypical curving of the spine called scoliosis. About 7% of people with CMT1B have symptoms that begin in childhood. These symptoms typically appear between that ages of 6 and 20 years old. The rest of people with CMT1B have a late onset version of this disorder and do not experience symptoms until they are adults. These symptoms tend to be more mild and often do not begin until after age 40.     The particular variant found in Noah called c.646-3C>G has been reported at least once in the literature before and this individual was reported to have demyelinating CMT with a reported age of onset of 43 years old (PMID: [id number]).    CMT2I, CMT2J, and CMTDID  CMT2I and CMT2J are more rare forms of CMT. These types of CMT are axonal, meaning that they cause damage to the axon or middle part of the nerve. Individuals with these types of CMT generally do not develop  symptoms until they are adults and often not before they are 50 years old. In addition to the general features of CMT that have already been described, individuals with CMT2I or CMT2J may experience more severe sensory symptoms, hearing loss and/or Adie's pupil, which is a change in the eye that causes to pupil to be large and to get smaller in response to light more slowly than expected. There is an intermediate type of CMT called CMTDID which causes damage to the axon and the myelin part of the nerves. There is disagreement between CMT experts and researchers regarding whether or not CMT2I, CMT2J and CMTDID should be viewed as separate subtypes of CMT or not. This is because there is overlap between the symptoms of these conditions,ultrasound findings and the results of nerve conduction study findings in individuals who were previously given diagnoses of each of these subtypes.    Dejerine-Sottas Syndrome and Congenital Hypomyelination  Individuals with a genetic change in their MPZ gene who present with symptoms in infancy are often given a diagnosis of Dejerine-Sottas syndrome and/or a diagnosis of congenital hypomyelination. These diagnoses are given to a patient based on clinical symptoms and the age that the symptoms occur. Not all infants who have an MPZ gene change are diagnosed with one or both of these conditions clinically. Additionally, not all individuals with these diagnoses have a mutation in the MPZ gene. Individuals with an infantile onset MPZ related disorder generally have very slow nerve conduction velocities and symptoms of neuropathy including muscle weakness, numbness and tingling in the extremities beginning prior to age 10. These conditions generally do not shorten lifespan but can result in an inability to walk.    Inheritance of MPZ-related CMT  CMT due to changes in the MPZ gene is most often inherited in an autosomal dominant manner, meaning that only one change is needed to cause  symptoms of this condition but it can be inherited in an autosomal recessive manner, meaning that an individual must have a change in both copies of a gene in order to have symptoms of this condition. Based on the family history and parental testing, it is clear that this condition is being passed down in an autosomal dominant manner in this case. For individuals that are diagnosed with autosomal dominant inherited CMT, with each pregnancy, there is a 50% chance that their child will be affected with this condition and a 50% chance that they will not be affected.    This means that if Noah has children in the future, there is a 1 in 2 (50%) chance of passing his MPZ mutation to any future child. There are options of testing a future child/embryo with IVF and preimplantation genetic testing, diagnostic testing during a pregnancy, or of course testing a child once they are born. I encourage Noah and his future partner to discuss this in further details with a prenatal genetic counselor if/when they are thinking of starting a family.     Parental testing was previously performed and Noah's mother, Ingrid, was found to have the MPZ variant. She may just now be starting to develop CMT symptoms. It is possible that her sisters and one of her parents also have this condition. If they are interested in testing for the familial MPZ variant, they can reach out to me anytime to discuss further.     It was a pleasure seeing Noah and his mother again today. They were encouraged to reach out to me if they have any further questions.     Plan:  Noah and his mother can reach out to me anytime with additional genetics questions or if any other family members are interested in genetic testing.      Evangelina Bermudez MS, [facility]  Licensed Genetic Counselor   [facility] [place]  Phone: [phone]  Fax: [phone]    Time spent in consultation face to face was approximately 20  minutes.    References:  The spectrum of Charcot Olya Tooth disease due to myelin protein zero: an electrodiagnostic, nerve ultrasound and histological study. Kriss thompson al    Casper CONROY et al., Genotype-phenotype characteristics and baseline natural history of heritable neuropathies caused by mutations in the MPZ gene. Brain 2015: 138; 5261-3131.    Clay T et al., Genetic spectrum of Charcot-Olya-Tooth disease associated with myelin protein zero gene variants in [place]. Clin [place]. 2021 Mar;99(3):359-375. doi: 10.1111/cge.24350. Epub 2020 Nov 27. PMID: [id number]; PMCID: [id number].      Again, thank you for allowing me to participate in the care of your patient.        Sincerely,        Tanesha Bermudez, GC

## 2023-03-14 NOTE — PROGRESS NOTES
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 He was seen for a genetic counseling appointment in coordination with Dr. Em today. He was accompanied by his mother, Ingrid.    I previously met Noah and his mother in 2020 for a virtual visit after Noah had a visit with Dr. Walls in our Neuromuscular clinic due to his history of hereditary sensory and motor peripheral neuropathy. He first started noticing symptoms when he was about 13-14 years old and reports that his symptoms of back pain, fatigue, and tingling in his hands and feet have gotten progressively worse. He reports that his walking is getting worse as well and reports that his legs get tired going up stairs. Noah has been followed by Dr. Walls for several years for his neuropathy. 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     Noah's other notable medical history includes diagnoses of ADHD, asthma, allergies, mild autism spectrum disorder, and carpal tunnel. He wears glasses and sees and optometrist annually. Noah denies any hearing loss, seizures, GI problems, and cardiac and respiratory concerns.    I coordinated genetic testing for Noah in 2020 which identified a variant of uncertain significance in the MPZ gene which was reclassified to pathogenic in 2021. 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 Noah is now followed by Dr. Em in the [facility] CMT clinic at [place]. I had the pleasure of meeting with Noah and his mother today at a follow-up visit with Dr. Em to review the genetic details of CMT1B and address their genetic questions.     Please refer to Dr. Em's note from today  for further details of Noah's medical history and evaluation from today.     Family History: A three generation pedigree was obtained 10/28/20 and scanned into the electronic medical record. The history was updated today and relevant portions are described below:      Siblings-     24 year old brother who has Asperger's and is otherwise heathy.  He has not had genetic testing    Parents-     Mother, Ingrid, is 43 years old. She has a history of endometriosis. She had genetic testing which was positive for the MPZ variant. She recently thought she was developing carpal tunnel but her orthopedic provider said it was more extensive than carpal tunnel so they think this might be early signs of CMT for Ingrid.     Father, Johnnie, is 45 years old. He has a history of numbness and tingling and possible lower extremity neuropathy since his 20's. He also has carpal tunnel and degenerative disk disease. He has not had a Neurology evaluation for his neuropathy. His genetic testing for the MPZ variant was negative.    Maternal Relatives-     Two maternal aunts. One aunt has a history of seizures in adulthood which are well controlled on medication. She has a 22 year old son and 17 year old daughter who are healthy. The other aunt has a history of thyroid problems.     Maternal grandmother has high cholesterol and is otherwise healthy.    Maternal grandfather has a history of high cholesterol and has a pacemaker placed.     Paternal Relatives-     One paternal uncle and two paternal aunts. His uncle is alive and well, as are his children. One of his aunts has thyroid problems and skin cancer. She has a daughter with autism and diabetes and her other children are healthy. The other aunt is alive and well, as are her children.     Paternal grandmother has a history of diabetes, thyroid problems, and skin cancer.     Paternal grandfather had a history of carpal tunnel and passed away at age 59 due to congestive heart failure.       Family history is otherwise largely non-contributory. Maternal ancestry is [place]ese, Spanish, and [place] and paternal ancestry is Spanish, [place]ese, Green[place]ic, and English. Consanguinity was denied.     Previous Genetic Testin/29/16- PMP22 deletion/duplication analysis: negative (2 copies of PMP22)    16- CMT1, dominant intermediate, recessive intermediate CMT Panel (27 genes):     MPZ c.646-3C>G (intron 5)  Variant of uncertain significance    11/15/20 (Adended 21) [facility] Comprehensive Neuropathies Panel (81 genes): POSITIVE    MPZ c.646-3C>G (intronic) Pathogenic variant    Genetic Counseling Discussion:  For review, our bodies are made of cells that contain our chromosomes which are made up of long stretches of DNA containing our genes. Our genes serve as the instructions for our bodies to grow and function. We have two copies of each gene, one inherited from our mother and one inherited from our father.     Noah's genetic testing identified a pathogenic variant in the MPZ gene called c.646-3C>G. This finding is consistent with a diagnosis of autosomal dominant MPZ-related disorder, with his presentation being most consistent with CMT1B.    Clinical presentation ot Charcot Olya Tooth disease  Charcot-Olya-Tooth Neuropathy (CMT) is common genetic form of peripheral neuropathy affecting 1 in 2500 individuals. In general, CMT is a condition that affects the peripheral nerves (the nerves outside of your brain and spinal cord) that lead to symptoms related to muscle movement and sensation. There are many different types of CMT. These types are labeled with numbers (1,2,4 etc). Some types of CMT damage the myelin that coats our nerves which controls the speed of the message that is sent, some damage a part of the nerves called the axons which carry the messages and control the strength of the message that is sent, and some cause damage to both parts of the nerve.This is determined based on  the results of the nerve conduction studies. CMT Type 1 is the demyelinating form of the condition.  Demyelinating  means that this type of CMT causes the covering around the nerve (myelin) to form incorrectly. When the myelin is not formed the messages cannot travel the whole length of the nerve and they do not travel at the speed that they would in an individual who does not have CMT. CMT Type 2 is the axonal form of the condition.  Axonal  means that the axon or the middle part of the nerve is damaged and the messages being sent down the nerves are not clear or accurate. This leads to the muscles not being able to understand the garbled message that is being sent. There are also some less common types of CMT (3, 4) that have a mixture of these axonal and neuronal symptoms. Additionally, there are many subtypes of CMT within each type. These subtypes are labeled with letters (A, B, C etc) and can only be determined through genetic testing.     All forms of CMT cause damage to two types of the nerves, the motor nerves (involved in muscle movement) and the sensory nerves (involved in sensation or feeling). Damage to motor nerves causes muscle weakness and difficulty with muscle movement, especially in the hands and feet. This can lead to difficulty walking, writing, putting on jewelry and many other types of movement. Damage to the sensory nerves can cause numbness, tingling and impaired balance, which can lead to fatigue.    CMT is highly variable, even within members of the same family. Individuals may not experience any symptoms, these symptoms may be mild, or they might begin as mild and gradually become more severe. Some individuals might experience more muscle movement related symptoms, while others might experience more difficulties with loss of sensation. Additionally, if a particular genetic change can cause multiple types of CMT, it is possible for individuals in the same family to have a different type of  CMT. This is rare, but may occur in some cases.    Pathogenic variants in the MPZ gene have been associated with several different clinical pictures (phenotypes) which are described in detail below.    CMT1B  CMT1B is the fourth most common type of CMT. This type of CMT is demyelinating, meaning it causes damage to the myelin part of the nerve or the covering that surrounds the nerve. The age when symptoms begin is different in different people with this condition. About 40% of individuals with CMT1B begin having symptoms before age 5. This may impact their ability to learn to walk. In addition to the features of CMT described above, these individuals may also have a misalignment of their hip joint during development called hip dysplasia, damage to the optic nerve which is the nerve that connects the eye to the brain and an atypical curving of the spine called scoliosis. About 7% of people with CMT1B have symptoms that begin in childhood. These symptoms typically appear between that ages of 6 and 20 years old. The rest of people with CMT1B have a late onset version of this disorder and do not experience symptoms until they are adults. These symptoms tend to be more mild and often do not begin until after age 40.     The particular variant found in Noah called c.646-3C>G has been reported at least once in the literature before and this individual was reported to have demyelinating CMT with a reported age of onset of 43 years old (PMID: 13005618).    CMT2I, CMT2J, and CMTDID  CMT2I and CMT2J are more rare forms of CMT. These types of CMT are axonal, meaning that they cause damage to the axon or middle part of the nerve. Individuals with these types of CMT generally do not develop symptoms until they are adults and often not before they are 50 years old. In addition to the general features of CMT that have already been described, individuals with CMT2I or CMT2J may experience more severe sensory symptoms, hearing  loss and/or Adie's pupil, which is a change in the eye that causes to pupil to be large and to get smaller in response to light more slowly than expected. There is an intermediate type of CMT called CMTDID which causes damage to the axon and the myelin part of the nerves. There is disagreement between CMT experts and researchers regarding whether or not CMT2I, CMT2J and CMTDID should be viewed as separate subtypes of CMT or not. This is because there is overlap between the symptoms of these conditions,ultrasound findings and the results of nerve conduction study findings in individuals who were previously given diagnoses of each of these subtypes.    Dejerine-Sottas Syndrome and Congenital Hypomyelination  Individuals with a genetic change in their MPZ gene who present with symptoms in infancy are often given a diagnosis of Dejerine-Sottas syndrome and/or a diagnosis of congenital hypomyelination. These diagnoses are given to a patient based on clinical symptoms and the age that the symptoms occur. Not all infants who have an MPZ gene change are diagnosed with one or both of these conditions clinically. Additionally, not all individuals with these diagnoses have a mutation in the MPZ gene. Individuals with an infantile onset MPZ related disorder generally have very slow nerve conduction velocities and symptoms of neuropathy including muscle weakness, numbness and tingling in the extremities beginning prior to age 10. These conditions generally do not shorten lifespan but can result in an inability to walk.    Inheritance of MPZ-related CMT  CMT due to changes in the MPZ gene is most often inherited in an autosomal dominant manner, meaning that only one change is needed to cause symptoms of this condition but it can be inherited in an autosomal recessive manner, meaning that an individual must have a change in both copies of a gene in order to have symptoms of this condition. Based on the family history and parental  testing, it is clear that this condition is being passed down in an autosomal dominant manner in this case. For individuals that are diagnosed with autosomal dominant inherited CMT, with each pregnancy, there is a 50% chance that their child will be affected with this condition and a 50% chance that they will not be affected.    This means that if Noah has children in the future, there is a 1 in 2 (50%) chance of passing his MPZ mutation to any future child. There are options of testing a future child/embryo with IVF and preimplantation genetic testing, diagnostic testing during a pregnancy, or of course testing a child once they are born. I encourage Noah and his future partner to discuss this in further details with a prenatal genetic counselor if/when they are thinking of starting a family.     Parental testing was previously performed and Noah's mother, Ingrid, was found to have the MPZ variant. She may just now be starting to develop CMT symptoms. It is possible that her sisters and one of her parents also have this condition. If they are interested in testing for the familial MPZ variant, they can reach out to me anytime to discuss further.     It was a pleasure seeing Noah and his mother again today. They were encouraged to reach out to me if they have any further questions.     Plan:  1. Noah and his mother can reach out to me anytime with additional genetics questions or if any other family members are interested in genetic testing.      Evangelina Berumdez MS, [facility]  Licensed Genetic Counselor   [facility]  Phone: [phone]  Fax: [phone]    Time spent in consultation face to face was approximately 20 minutes.    References:  The spectrum of Charcot Olya Tooth disease due to myelin protein zero: an electrodiagnostic, nerve ultrasound and histological study. Tierra CONROY et al., Genotype-phenotype characteristics and baseline  natural history of heritable neuropathies caused by mutations in the MPZ gene. Brain 2015: 138; 2303-7176.    Clay T et al., Genetic spectrum of Charcot-Olya-Tooth disease associated with myelin protein zero gene variants in [place]. Clin [place]ma. 2021 Mar;99(3):359-375. doi: 10.1111/cge.62146. Epub 2020 Nov 27. PMID: [id number]; PMCID: [id number].

## 2023-06-03 ENCOUNTER — HEALTH MAINTENANCE LETTER (OUTPATIENT)
Age: 23
End: 2023-06-03

## 2024-02-07 ENCOUNTER — PRE VISIT (OUTPATIENT)
Dept: NEUROLOGY | Facility: CLINIC | Age: 24
End: 2024-02-07
Payer: COMMERCIAL

## 2024-02-07 DIAGNOSIS — G60.0 CMT (CHARCOT-MARIE-TOOTH DISEASE): Primary | ICD-10-CM

## 2024-02-07 NOTE — CONFIDENTIAL NOTE
"CMT RETURN PATIENT  Do you have any questions/concerns or new issues you would like to address at your appt? Things are getting worse but mostly with his right knee. Pain started after his last appt with Dr Em and has been waxing/waning since then. He feels it has become worse over the last month. He saw his PCP in the past and was sent to PT which he felt helped.   How has your mobility been since the last office visit? No new concerns  -Have you had an increase in falls or any mobility/balance concerns? No  Do you know what type of CMT you have? CMT1B with MPZ       -Do you have any questions for the genetic counselor? No  Sensory Loss  - Do you have loss of feeling or numbness anywhere in your feet or legs? No  - If so, does the loss of feeling extend above your toes? N/A  - Does it extend above the ankle? N/A  - Can you identify the point where the sensation becomes normal or nearly normal? N/A  - Are these symptoms constant (present all the time), present most of the daytime, less than one-half of the daytime, or just occasionally (Daytime is defined as the time between getting up and going to bed)? N/A   Motor Symptoms- Legs  - Do you have weakness in your legs or feet? Yes   - Do you ever trip over your toes/feet or turn or sprain your ankles? No  - Do your feet slap down on the ground when you walk? He doesn't think so but said \"yes\" when asked if he had drop foot.   - Do you wear shoe inserts/insoles (below the ankle)? Yes bilateral   - Do you wear braces, splints or an equivalent type of orthotic that extends above your ankle? No If so, how are these working for you? N/A  - Have the above ankle orthotics described above ever been prescribed or suggested by a healthcare professional? No  - Have you ever had surgery on your feet or ankles? No  - If so, do you know if the surgery involved fusion of bones, a transfer of tendons, heel cord lengthening or lowering of the arch? N/A  - Do you use a cane, walking " "stick, or walker to help you walk most of the time outside the home? No If not, do you feel adaptive equipment would be beneficial? He has been using sticks from the yard when walking around. May benefit from an official walking stick.   - Do you use a wheelchair most of the time because of weakness? No  Motor Symptoms- Arms  - Do you have difficulty with buttoning clothes (standard shirt buttons)? \"No but my right hand always cracks\"   - If yes, are the difficulties mild or severe (severe includes unable)? N/A  - Can you cut most food including meat and pizza with normal utensils? Yes  - Do you have difficulty with activities that require extending or flexing your arms, or activities using your upper arms? Occasional muscle weakness with vacuuming and other repetitive movements but other than that, he has good strength          -Do you have any difficulty with gripping or pinching object? No         -Do you have any hand splints that you currently wear or have these ever been used in the past (if yes, please ask that they bring these to the appt)? No     We will have a  available in clinic. Would you be interested in discussing any of the following topics: Yes, community resources    - Initiating the disability process  -Transportation issues  -Financial concerns   - Other community resources  Are you registered with the MDA (Muscular Dystrophy Association)? Yes   To determine if you qualify for any CMT research studies, would you be interested in meeting with the research coordinator? Yes   \"We will contact you once the schedule has been completed to let you know what time you need to arrive. Disregard the automated appointment reminder call, I will call you directly to let you know what time to be here.\"    Recap of services needed: PT, Orthotics, SW and research     Kirstin Brewster RN Care Coordinator   Neurology/Neurosurgery/PM&R/ Pain Management       "

## 2024-02-13 ENCOUNTER — DOCUMENTATION ONLY (OUTPATIENT)
Dept: ORTHOPEDICS | Facility: CLINIC | Age: 24
End: 2024-02-13

## 2024-02-13 ENCOUNTER — OFFICE VISIT (OUTPATIENT)
Dept: NEUROLOGY | Facility: CLINIC | Age: 24
End: 2024-02-13
Payer: COMMERCIAL

## 2024-02-13 ENCOUNTER — PATIENT OUTREACH (OUTPATIENT)
Dept: CARE COORDINATION | Facility: CLINIC | Age: 24
End: 2024-02-13

## 2024-02-13 DIAGNOSIS — G60.0 CMT (CHARCOT-MARIE-TOOTH DISEASE): ICD-10-CM

## 2024-02-13 DIAGNOSIS — M25.561 CHRONIC PAIN OF RIGHT KNEE: Primary | ICD-10-CM

## 2024-02-13 DIAGNOSIS — G89.29 CHRONIC PAIN OF RIGHT KNEE: Primary | ICD-10-CM

## 2024-02-13 PROCEDURE — 99213 OFFICE O/P EST LOW 20 MIN: CPT | Performed by: PSYCHIATRY & NEUROLOGY

## 2024-02-13 NOTE — PATIENT INSTRUCTIONS
Examination remains very good.  You will get a call for an orthopedic consultation appointment.  Our staff will provide you with information about the CMT Association and Hereditary Neuropathy Foundation.   Return 1 year.

## 2024-02-13 NOTE — PROGRESS NOTES
S. Pt. was seen today, at Dr. Em's [facility] clinic in [place], with his Mother, for an evaluation for lower extremity bracing.    O.Goal. To help stabilize ankles and give proper support.    A. Pt. currently has custom made FO's that had worked for him well in the past but is due to new orthotics due to general wear. His current fo's are appox. 1 year old. He is an active 23 year old male that wears hiking boots for extra support. He is in need of fo's to help stabilize his ankles. He is starting to have bilateral medial knee pain. For this, I have taken new biofoam impressions and will have new custom fo's fabricated for him. The impressions have been sent to our main lab for fabrication. I have requested an Rx. from Dr. Em.    P. Pt. is scheduled to arrive back to our location in approx. 2 months for the fitting. Pt. has been instructed to contact our facility with any future questions and/or concerns.    Good CAN/MICHAEL

## 2024-02-13 NOTE — PROGRESS NOTES
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 Chief complaint from pre-visit call:    Things are getting worse but mostly with his right knee. Pain started after his last appt with Dr Em and has been waxing/waning since then. He feels it has become worse over the last month. He saw his PCP in the past and was sent to PT which he felt helped.         0 1 2 3 4   Sensory symptoms None Below or at ankle bones Symptoms up to the distal half of the calf Symptoms up to the proximal half of the calf, including knee Symptoms above knee (above the top of the patella)   Motor symptoms - legs None  Trips, catches toes, slaps feet, shoe inserts Ankle support or stabilization needed most of the time for ambulation Walking aids (cane, walker) needed most of the time Wheelchair most of the time   Motor symptoms - arms None Mild difficulty with buttons Severe difficulty or unable to do buttons Unable to cut most foods Proximal weakness (affect movements involving the elbow and above)   Pin sensibility Normal Decreased below or at ankle bones Decreased up to the distal half of the calf Decreased up to the proximal half of the calf, including knee Decreased above knee (above the  top of the patella)   Vibration  Normal Reduced at great toe Reduced at ankle Reduced at knee (tibial tuberosity) Absent at knee and ankle   Strength - legs Normal 4+, 4, or 4- on foot dorsi- or plantarflexion </= 3 on foot dorsi- or plantarflexion </= 3 on foot dorsi- and plantarflexion Proximal weakness   Strength - arms Normal 4+, 4, or 4- on intrinsic hand muscles </= 3 on intrinsic hand muscles < 5 on wrist extensors Weak above elbow   Ulnar CMAP (Median)        Radial SNAP             Physical examination    No swelling, redness, or warmth at right knee. No pain with PROM right knee. Equivocally more ROM right ankle than left.     Mental state: Alert, appropriate, speech, language, and thought content normal.     Cranial nerves II-XII  normal.    Sensory examination:     Right Left   Light touch Normal Normal   Vibration (timed)     Vibration (Rydell-Seiffer) 7 7   Temp     Pin Normal Normal   Pos     Legend:   MM = medial malleolus, TT = tibial tuberosity, K = patella, MCP = MCP joint  MF = mid-foot, DC = distal calf, MC = mid calf, PC = proximal calf      Motor examination:     Right Left   Shoulder abduction  5 5   Elbow extension 5 5   Elbow flexion 5 5   Wrist extension  5 5   Finger extension 5 5   FDI 5 5   APB 5 5   Hip flexion 5 5   Knee flexion 5 5   Knee extension 5- 5   Dorsiflexion 5 5   Plantar flexion 5 5   A=atrophy    Inversion/eversion 5/5 bilaterally    Tone normal     Reflexes:   Right Left   Biceps 0 0   Patellar 2 2   Achilles 0 0   Plantar Flexor Flexor   Clonus Absent Absent      Coordination:  Finger-nose normal.  Heel-shin normal.  RRMs normal.    Gait:  Independent. Query abnormal right foot fall or knee extension.      Impression and recommendations:    Examination remains very good.  You will get a call for an orthopedic consultation appointment.  Our staff will provide you with information about the CMT Association and Hereditary Neuropathy Foundation.   Return 1 year.    Feliciano Em M.D.      20 minutes spent on the date of the encounter on chart review, history and examination, documentation and further activities as noted above.

## 2024-02-13 NOTE — LETTER
2/13/2024         RE: oNah Ennis  [address]  [place] [address]        Dear Colleague,    Thank you for referring your patient, Noah Ennis, to the [facility] NEUROLOG[facility]IN[facility] [place]. Please see a copy of my visit note below.    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 Chief complaint from pre-visit call:    Things are getting worse but mostly with his right knee. Pain started after his last appt with Dr Em and has been waxing/waning since then. He feels it has become worse over the last month. He saw his PCP in the past and was sent to PT which he felt helped.         0 1 2 3 4   Sensory symptoms None Below or at ankle bones Symptoms up to the distal half of the calf Symptoms up to the proximal half of the calf, including knee Symptoms above knee (above the top of the patella)   Motor symptoms - legs None  Trips, catches toes, slaps feet, shoe inserts Ankle support or stabilization needed most of the time for ambulation Walking aids (cane, walker) needed most of the time Wheelchair most of the time   Motor symptoms - arms None Mild difficulty with buttons Severe difficulty or unable to do buttons Unable to cut most foods Proximal weakness (affect movements involving the elbow and above)   Pin sensibility Normal Decreased below or at ankle bones Decreased up to the distal half of the calf Decreased up to the proximal half of the calf, including knee Decreased above knee (above the  top of the patella)   Vibration  Normal Reduced at great toe Reduced at ankle Reduced at knee (tibial tuberosity) Absent at knee and ankle   Strength - legs Normal 4+, 4, or 4- on foot dorsi- or plantarflexion </= 3 on foot dorsi- or plantarflexion </= 3 on foot dorsi- and plantarflexion Proximal weakness   Strength - arms Normal 4+, 4, or 4- on intrinsic hand muscles </= 3 on intrinsic hand muscles < 5 on wrist extensors Weak above elbow   Ulnar CMAP (Median)        Radial SNAP              Physical examination    No swelling, redness, or warmth at right knee. No pain with PROM right knee. Equivocally more ROM right ankle than left.     Mental state: Alert, appropriate, speech, language, and thought content normal.     Cranial nerves II-XII normal.    Sensory examination:     Right Left   Light touch Normal Normal   Vibration (timed)     Vibration (Rydell-Seiffer) 7 7   Temp     Pin Normal Normal   Pos     Legend:   MM = medial malleolus, TT = tibial tuberosity, K = patella, MCP = MCP joint  MF = mid-foot, DC = distal calf, MC = mid calf, PC = proximal calf      Motor examination:     Right Left   Shoulder abduction  5 5   Elbow extension 5 5   Elbow flexion 5 5   Wrist extension  5 5   Finger extension 5 5   FDI 5 5   APB 5 5   Hip flexion 5 5   Knee flexion 5 5   Knee extension 5- 5   Dorsiflexion 5 5   Plantar flexion 5 5   A=atrophy    Inversion/eversion 5/5 bilaterally    Tone normal     Reflexes:   Right Left   Biceps 0 0   Patellar 2 2   Achilles 0 0   Plantar Flexor Flexor   Clonus Absent Absent      Coordination:  Finger-nose normal.  Heel-shin normal.  RRMs normal.    Gait:  Independent. Query abnormal right foot fall or knee extension.      Impression and recommendations:    Examination remains very good.  You will get a call for an orthopedic consultation appointment.  Our staff will provide you with information about the CMT [employer] and [employer].   Return 1 year.    Feliciano Em M.D.      20 minutes spent on the date of the encounter on chart review, history and examination, documentation and further activities as noted above.        Again, thank you for allowing me to participate in the care of your patient.        Sincerely,        Feliciano Em MD

## 2024-02-13 NOTE — PROGRESS NOTES
Social Work Note  [facility]     Patient Name:  Noah Ennis  /Age:  2000 (23 year old)    Referral Source: Dr Em - Neurology  Reason for Referral:  [facility]     met with Patient and his mother, Ingrid in person on 2024 as part of [facility].    Matthew is a 23 yr old male that resides in [place] with his parents, Ingrid and Johnnie. Matthew is not currently working and last worked about 6 months ago. Matthew has historically diagnosed with Autism and night terrors. He does receive Social Security Disability of approximately $900/month.     Matthew is well connected with [facility]. He receives [facility] support 4 hrs/week for assistance with finances and employment. He is open to a [employer] Waiver.  He is connected with a mental health provider and sees this individual every two weeks. He denies any changes or concerns with his [facility] support team and providers.    Matthew and his mother shared their main concerns for today were regarding his increasing knee pain.    Provided them with writer contact information and encouraged them to call with any additional concerns or questions. Sw will continue to assist as needed.               ADONIS Larson, [facility]    [facility]  [phone]  [email or web address]

## 2024-03-11 ENCOUNTER — ANCILLARY PROCEDURE (OUTPATIENT)
Dept: GENERAL RADIOLOGY | Facility: CLINIC | Age: 24
End: 2024-03-11
Attending: PREVENTIVE MEDICINE
Payer: COMMERCIAL

## 2024-03-11 ENCOUNTER — OFFICE VISIT (OUTPATIENT)
Dept: ORTHOPEDICS | Facility: CLINIC | Age: 24
End: 2024-03-11
Attending: PSYCHIATRY & NEUROLOGY
Payer: COMMERCIAL

## 2024-03-11 DIAGNOSIS — M25.561 RIGHT KNEE PAIN: Primary | ICD-10-CM

## 2024-03-11 DIAGNOSIS — M25.561 RIGHT KNEE PAIN: ICD-10-CM

## 2024-03-11 DIAGNOSIS — G89.29 CHRONIC PAIN OF RIGHT KNEE: ICD-10-CM

## 2024-03-11 DIAGNOSIS — M22.2X1 PATELLOFEMORAL PAIN SYNDROME OF RIGHT KNEE: Primary | ICD-10-CM

## 2024-03-11 DIAGNOSIS — M25.561 CHRONIC PAIN OF RIGHT KNEE: ICD-10-CM

## 2024-03-11 PROCEDURE — 99204 OFFICE O/P NEW MOD 45 MIN: CPT | Performed by: PREVENTIVE MEDICINE

## 2024-03-11 PROCEDURE — 73562 X-RAY EXAM OF KNEE 3: CPT | Mod: RT | Performed by: RADIOLOGY

## 2024-03-11 RX ORDER — MELOXICAM 15 MG/1
15 TABLET ORAL DAILY PRN
Qty: 30 TABLET | Refills: 0 | Status: SHIPPED | OUTPATIENT
Start: 2024-03-11

## 2024-03-11 NOTE — PROGRESS NOTES
HISTORY OF PRESENT ILLNESS  Mr. Ennis is a pleasant 23 year old year old male who presents to clinic today with the following:  What problem are you here for? Right knee pain  No previous injury    How long have you had this problem? ~1 yr ago    Have you had any recent imaging of this problem? Xrays/MRI/CT scans? Yes    Have you had treatments for this problem in the past?  -Medications? Yes  -Physical therapy? Yes  -Injections? No  -Surgery? No    How severe is this problem today? 0-10 scale? 3    How severe has this problem been at WORST in the past? 0-10 scale? 8    What do you think caused this problem? Was hunting before but does not remember an acute event    Does this problem or its symptoms cause difficulty for you falling asleep or staying asleep? No    Anything else you want us to know about this problem? Bending/twisting motions cause the most pain along with walking, has tried PT, bracing, medication, compression, rest and ice.          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       Current Outpatient Medications   Medication Sig Dispense Refill    albuterol (PROAIR HFA, PROVENTIL HFA, VENTOLIN HFA) 108 (90 BASE) MCG/ACT inhaler Inhale 2 puffs into the lungs every 4 hours as needed for shortness of breath / dyspnea or wheezing      albuterol (PROVENTIL) (5 MG/ML) 0.5% nebulizer solution Take 2.5 mg by nebulization every 6 hours as needed for wheezing or shortness of breath / dyspnea      budesonide-formoterol (SYMBICORT) 160-4.5 MCG/ACT inhaler Inhale 2 puffs into the lungs as needed      cetirizine (ZYRTEC) 10 MG tablet Take 10 mg by mouth daily      CLONAZEPAM PO Take 0.5 mg by mouth At Bedtime      DULoxetine (CYMBALTA) 60 MG capsule Take 60 mg by mouth daily      fluticasone (FLONASE) 50 MCG/ACT nasal spray Spray 2  sprays into both nostrils 2 times daily       Montelukast Sodium (SINGULAIR PO) Take 5 mg by mouth At Bedtime      Multiple Vitamins-Minerals (MULTIVITAMIN GUMMIES ADULTS) CHEW Take 2 chew tab by mouth daily      sodium fluoride 1.1 % CREA Take 2 Application by mouth daily         Allergies   Allergen Reactions    Dust Mites     Mold     Seasonal Allergies     Strattera [Atomoxetine] Hives       No family history on file.  Social History     Socioeconomic History    Marital status: Single   Tobacco Use    Smoking status: Never    Smokeless tobacco: Never   Substance and Sexual Activity    Alcohol use: No     Alcohol/week: 0.0 standard drinks of alcohol    Drug use: No       Additional medical/Social/Surgical histories reviewed in [facility] and updated as appropriate.     REVIEW OF SYSTEMS (3/11/2024)  10 point ROS of systems including Constitutional, Eyes, Respiratory, Cardiovascular, Gastroenterology, Genitourinary, Integumentary, Musculoskeletal, Psychiatric, Allergic/Immunologic were all negative except for pertinent positives noted in my HPI.     PHYSICAL EXAM  VSS    General  - normal appearance, in no obvious distress  HEENT  - conjunctivae not injected, moist mucous membranes, normocephalic/atraumatic head, ears normal appearance, no lesions, mouth normal appearance, no scars, normal dentition and teeth present  CV  - normal popliteal pulse  Pulm  - normal respiratory pattern, non-labored  Musculoskeletal - right knee  - stance: normal gait without limp, no obvious leg length discrepancy, single-leg squat exhibits knee valgus, internal rotation of the hip, contralateral hip drop  - inspection: no swelling or effusion, normal muscle tone, normal bone and joint alignment, no obvious deformity  - palpation: no joint line tenderness, patellar tendon non-tender, tender medial patellar facet  - ROM: 135 degrees flexion, -5 degrees extension, not painful, crepitus with weight-bearing flexion  - strength: 5/5 in flexion,  5/5 in extension  - neuro: no sensory or motor deficit  - special tests:  (-) Lachman  (-) anterior drawer  (-) Gary  (-) Thessaly  (-) varus at 0 and 30 degrees flexion  (-) valgus at 0 and 30 degrees flexion  (+) Jonathan s compression test  (+) patellar grind  (-) patellar apprehension  Neuro  - no sensory or motor deficit, grossly normal coordination, normal muscle tone  Skin  - no ecchymosis, erythema, warmth, or induration, no obvious rash  Psych  - interactive, appropriate, normal mood and affect   ASSESSMENT & PLAN  22 yo male with right knee patellofemral pain    I independently reviewed the following imaging studies:  Right knee xray: show no significant abnormalities  Given HEP for PF pain  Rx given for mobic  Can use prn or voltaren gel PRN  Followup within 1 month and discuss possible knee injection vs. More PT if helping to include taping for knee  Patient HAS  in the past  completed physical therapy for 4-6 weeks  Patient has been doing home exercise physical therapy program for this problem      Appropriate PPE was utilized for prevention of spread of Covid-19.  Feliciano Phan MD, CAQSM

## 2024-03-11 NOTE — LETTER
3/11/2024         RE: Noah Ennis  [address] [facility] [address]        Dear Colleague,    Thank you for referring your patient, Noah Ennis, to the [facility] SPORTS [facility]ICINE [facility]IN[facility] [place]. Please see a copy of my visit note below.    HISTORY OF PRESENT ILLNESS  Mr. Ennis is a pleasant 23 year old year old male who presents to clinic today with the following:  What problem are you here for? Right knee pain  No previous injury    How long have you had this problem? ~1 yr ago    Have you had any recent imaging of this problem? Xrays/MRI/CT scans? Yes    Have you had treatments for this problem in the past?  -Medications? Yes  -Physical therapy? Yes  -Injections? No  -Surgery? No    How severe is this problem today? 0-10 scale? 3    How severe has this problem been at WORST in the past? 0-10 scale? 8    What do you think caused this problem? Was hunting before but does not remember an acute event    Does this problem or its symptoms cause difficulty for you falling asleep or staying asleep? No    Anything else you want us to know about this problem? Bending/twisting motions cause the most pain along with walking, has tried PT, bracing, medication, compression, rest and ice.          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       Current Outpatient Medications   Medication Sig Dispense Refill     albuterol (PROAIR HFA, PROVENTIL HFA, VENTOLIN HFA) 108 (90 BASE) MCG/ACT inhaler Inhale 2 puffs into the lungs every 4 hours as needed for shortness of breath / dyspnea or wheezing       albuterol (PROVENTIL) (5 MG/ML) 0.5% nebulizer solution Take 2.5 mg by nebulization every 6 hours as needed for wheezing or shortness of breath / dyspnea       budesonide-formoterol (SYMBICORT) 160-4.5 MCG/ACT inhaler  Inhale 2 puffs into the lungs as needed       cetirizine (ZYRTEC) 10 MG tablet Take 10 mg by mouth daily       CLONAZEPAM PO Take 0.5 mg by mouth At Bedtime       DULoxetine (CYMBALTA) 60 MG capsule Take 60 mg by mouth daily       fluticasone (FLONASE) 50 MCG/ACT nasal spray Spray 2 sprays into both nostrils 2 times daily        Montelukast Sodium (SINGULAIR PO) Take 5 mg by mouth At Bedtime       Multiple Vitamins-Minerals (MULTIVITAMIN GUMMIES ADULTS) CHEW Take 2 chew tab by mouth daily       sodium fluoride 1.1 % CREA Take 2 Application by mouth daily         Allergies   Allergen Reactions     Dust Mites      Mold      Seasonal Allergies      Strattera [Atomoxetine] Hives       No family history on file.  Social History     Socioeconomic History     Marital status: Single   Tobacco Use     Smoking status: Never     Smokeless tobacco: Never   Substance and Sexual Activity     Alcohol use: No     Alcohol/week: 0.0 standard drinks of alcohol     Drug use: No       Additional medical/Social/Surgical histories reviewed in [facility] and updated as appropriate.     REVIEW OF SYSTEMS (3/11/2024)  10 point ROS of systems including Constitutional, Eyes, Respiratory, Cardiovascular, Gastroenterology, Genitourinary, Integumentary, Musculoskeletal, Psychiatric, Allergic/Immunologic were all negative except for pertinent positives noted in my HPI.     PHYSICAL EXAM  VSS    General  - normal appearance, in no obvious distress  HEENT  - conjunctivae not injected, moist mucous membranes, normocephalic/atraumatic head, ears normal appearance, no lesions, mouth normal appearance, no scars, normal dentition and teeth present  CV  - normal popliteal pulse  Pulm  - normal respiratory pattern, non-labored  Musculoskeletal - right knee  - stance: normal gait without limp, no obvious leg length discrepancy, single-leg squat exhibits knee valgus, internal rotation of the hip, contralateral hip drop  - inspection: no swelling or effusion,  normal muscle tone, normal bone and joint alignment, no obvious deformity  - palpation: no joint line tenderness, patellar tendon non-tender, tender medial patellar facet  - ROM: 135 degrees flexion, -5 degrees extension, not painful, crepitus with weight-bearing flexion  - strength: 5/5 in flexion, 5/5 in extension  - neuro: no sensory or motor deficit  - special tests:  (-) Lachman  (-) anterior drawer  (-) Gary  (-) Thessaly  (-) varus at 0 and 30 degrees flexion  (-) valgus at 0 and 30 degrees flexion  (+) Jonathan s compression test  (+) patellar grind  (-) patellar apprehension  Neuro  - no sensory or motor deficit, grossly normal coordination, normal muscle tone  Skin  - no ecchymosis, erythema, warmth, or induration, no obvious rash  Psych  - interactive, appropriate, normal mood and affect   ASSESSMENT & PLAN  24 yo male with right knee patellofemral pain    I independently reviewed the following imaging studies:  Right knee xray: show no significant abnormalities  Given HEP for PF pain  Rx given for mobic  Can use prn or voltaren gel PRN  Followup within 1 month and discuss possible knee injection vs. More PT if helping to include taping for knee  Patient HAS  in the past  completed physical therapy for 4-6 weeks  Patient has been doing home exercise physical therapy program for this problem      Appropriate PPE was utilized for prevention of spread of Covid-19.  Feliciano Phan MD, [facility]        Again, thank you for allowing me to participate in the care of your patient.        Sincerely,        Feliciano Phan MD

## 2024-04-01 ENCOUNTER — VIRTUAL VISIT (OUTPATIENT)
Dept: ORTHOPEDICS | Facility: CLINIC | Age: 24
End: 2024-04-01
Payer: COMMERCIAL

## 2024-04-01 DIAGNOSIS — G89.29 CHRONIC PAIN OF RIGHT KNEE: Primary | ICD-10-CM

## 2024-04-01 DIAGNOSIS — M25.561 CHRONIC PAIN OF RIGHT KNEE: Primary | ICD-10-CM

## 2024-04-01 DIAGNOSIS — M22.2X1 PATELLOFEMORAL PAIN SYNDROME OF RIGHT KNEE: ICD-10-CM

## 2024-04-01 DIAGNOSIS — M22.41 CHONDROMALACIA OF RIGHT PATELLA: ICD-10-CM

## 2024-04-01 PROCEDURE — 99214 OFFICE O/P EST MOD 30 MIN: CPT | Mod: 93 | Performed by: PREVENTIVE MEDICINE

## 2024-04-01 RX ORDER — DICLOFENAC SODIUM 75 MG/1
75 TABLET, DELAYED RELEASE ORAL 2 TIMES DAILY PRN
Qty: 60 TABLET | Refills: 1 | Status: SHIPPED | OUTPATIENT
Start: 2024-04-01

## 2024-04-01 NOTE — LETTER
4/1/2024         RE: Noah Ennis  [address]  [facility] [address]        Dear Colleague,    Thank you for referring your patient, Noah Ennis, to the [facility] [place]. Please see a copy of my visit note below.    Patient is a   23  year old who is being evaluated via a billable telephone visit.      What phone number would you like to be contacted at? CELL  How would you like to obtain your AVS? MYCHART        Subjective   Patient is a   23  year old who presents by phone call visit for the following:     HPI   Followup for right knee pain  Symptoms not resolved  Continues to have knee pain  Completed physical therapy and using mobic daily with little improvement      Review of Systems   Constitutional, HEENT, cardiovascular, pulmonary, gi and gu systems are negative, except as otherwise noted.      Objective           Vitals:  No vitals were obtained today due to virtual visit.    Physical Exam   healthy, alert, and no distress  PSYCH: Alert and oriented times 3; coherent speech, normal   rate and volume, able to articulate logical thoughts, able   to abstract reason, no tangential thoughts, no hallucinations   or delusions  His affect is normal  RESP: No cough, no audible wheezing, able to talk in full sentences  Remainder of exam unable to be completed due to telephone visits    Assessment/Plan  22 yo male with right knee pain, patellofemoral pain, chronic      I independently reviewed the following imaging studies and discussed with patient:  Right knee xrays: no abnormalities  Right knee MRI from 2022 show s no abnormalities  Discussed and ordered another MRI due to chronic pain in knee not responding to conservative cares  Discussed with him and his mother and ordered another knee MRI and given rX for voltaren bid PRN          Phone call duration: 20 minutes  Phone call start: 100pm  Phone call end: 120pm  Dr Phan      Again, thank you for allowing me  to participate in the care of your patient.        Sincerely,        Feliciano Phan MD

## 2024-04-01 NOTE — LETTER
4/1/2024         RE: Noah Ennis  [address]  [facility] [address]        Dear Colleague,    Thank you for referring your patient, Noah Ennis, to the [facility] SP[facility]ICINE CLINIC [place]. Please see a copy of my visit note below.    Patient is a   23  year old who is being evaluated via a billable telephone visit.      What phone number would you like to be contacted at? CELL  How would you like to obtain your AVS? MYCHART        Subjective   Patient is a   23  year old who presents by phone call visit for the following:     HPI   Followup for right knee pain  Symptoms not resolved  Continues to have knee pain  Completed physical therapy and using mobic daily with little improvement      Review of Systems   Constitutional, HEENT, cardiovascular, pulmonary, gi and gu systems are negative, except as otherwise noted.      Objective           Vitals:  No vitals were obtained today due to virtual visit.    Physical Exam   healthy, alert, and no distress  PSYCH: Alert and oriented times 3; coherent speech, normal   rate and volume, able to articulate logical thoughts, able   to abstract reason, no tangential thoughts, no hallucinations   or delusions  His affect is normal  RESP: No cough, no audible wheezing, able to talk in full sentences  Remainder of exam unable to be completed due to telephone visits    Assessment/Plan  24 yo male with right knee pain, patellofemoral pain, chronic      I independently reviewed the following imaging studies and discussed with patient:  Right knee xrays: no abnormalities  Right knee MRI from 2022 show s no abnormalities  Discussed and ordered another MRI due to chronic pain in knee not responding to conservative cares  Discussed with him and his mother and ordered another knee MRI and given rX for voltaren bid PRN          Phone call duration: 20 minutes  Phone call start: 100pm  Phone call end: 120pm  Dr Phan      Again, thank you for allowing me  to participate in the care of your patient.        Sincerely,        Feliciano Phan MD

## 2024-04-01 NOTE — PROGRESS NOTES
Patient is a   23  year old who is being evaluated via a billable telephone visit.      What phone number would you like to be contacted at? CELL  How would you like to obtain your AVS? JOSE        Subjective   Patient is a   23  year old who presents by phone call visit for the following:     HPI   Followup for right knee pain  Symptoms not resolved  Continues to have knee pain  Completed physical therapy and using mobic daily with little improvement      Review of Systems   Constitutional, HEENT, cardiovascular, pulmonary, gi and gu systems are negative, except as otherwise noted.      Objective           Vitals:  No vitals were obtained today due to virtual visit.    Physical Exam   healthy, alert, and no distress  PSYCH: Alert and oriented times 3; coherent speech, normal   rate and volume, able to articulate logical thoughts, able   to abstract reason, no tangential thoughts, no hallucinations   or delusions  His affect is normal  RESP: No cough, no audible wheezing, able to talk in full sentences  Remainder of exam unable to be completed due to telephone visits    Assessment/Plan  22 yo male with right knee pain, patellofemoral pain, chronic      I independently reviewed the following imaging studies and discussed with patient:  Right knee xrays: no abnormalities  Right knee MRI from 2022 show s no abnormalities  Discussed and ordered another MRI due to chronic pain in knee not responding to conservative cares  Discussed with him and his mother and ordered another knee MRI and given rX for voltaren bid PRN          Phone call duration: 20 minutes  Phone call start: 100pm  Phone call end: 120pm  Dr Phan

## 2024-05-06 ENCOUNTER — ANCILLARY PROCEDURE (OUTPATIENT)
Dept: MRI IMAGING | Facility: CLINIC | Age: 24
End: 2024-05-06
Attending: PREVENTIVE MEDICINE
Payer: COMMERCIAL

## 2024-05-06 DIAGNOSIS — G89.29 CHRONIC PAIN OF RIGHT KNEE: ICD-10-CM

## 2024-05-06 DIAGNOSIS — M25.561 CHRONIC PAIN OF RIGHT KNEE: ICD-10-CM

## 2024-05-06 DIAGNOSIS — M22.2X1 PATELLOFEMORAL PAIN SYNDROME OF RIGHT KNEE: ICD-10-CM

## 2024-05-06 DIAGNOSIS — M22.41 CHONDROMALACIA OF RIGHT PATELLA: ICD-10-CM

## 2024-05-06 PROCEDURE — 73721 MRI JNT OF LWR EXTRE W/O DYE: CPT | Mod: RT | Performed by: RADIOLOGY

## 2024-05-15 ENCOUNTER — VIRTUAL VISIT (OUTPATIENT)
Dept: ORTHOPEDICS | Facility: CLINIC | Age: 24
End: 2024-05-15
Payer: COMMERCIAL

## 2024-05-15 DIAGNOSIS — G89.29 CHRONIC PAIN OF RIGHT KNEE: Primary | ICD-10-CM

## 2024-05-15 DIAGNOSIS — M25.561 CHRONIC PAIN OF RIGHT KNEE: Primary | ICD-10-CM

## 2024-05-15 DIAGNOSIS — M22.2X1 PATELLOFEMORAL PAIN SYNDROME OF RIGHT KNEE: ICD-10-CM

## 2024-05-15 PROCEDURE — 99213 OFFICE O/P EST LOW 20 MIN: CPT | Mod: 93 | Performed by: PREVENTIVE MEDICINE

## 2024-05-15 NOTE — PROGRESS NOTES
Patient is a   23  year old who is being evaluated via a billable telephone visit.      What phone number would you like to be contacted at? CELL  How would you like to obtain your AVS? JOSE        Subjective   Patient is a   23  year old who presents by phone call visit for the following:     HPI   Followup for right knee MRI  Overall doing better  Spoke to mother  aMtthew had GI disturbance from voltaren  Knee is starting to feel better with home exercises/PT    Review of Systems   Constitutional, HEENT, cardiovascular, pulmonary, gi and gu systems are negative, except as otherwise noted.      Objective           Vitals:  No vitals were obtained today due to virtual visit.    Physical Exam   healthy, alert, and no distress  PSYCH: Alert and oriented times 3; coherent speech, normal   rate and volume, able to articulate logical thoughts, able   to abstract reason, no tangential thoughts, no hallucinations   or delusions  His affect is normal  RESP: No cough, no audible wheezing, able to talk in full sentences  Remainder of exam unable to be completed due to telephone visits    Assessment/Plan  24 yo male with right knee pain due to patellofemoral syndrome, normal MRI      I independently reviewed the following imaging studies and discussed with patient:  Right knee MRI shows no abnormalities in cartilage or meniscus, ligaments normal  Discussed cont. HEP  Consider CSI  If not improving in 1-2 months or later  Discussed plan with mother          Phone call duration: 20 minutes  Phone call start: 300pm  Phone call end: 320pm  Dr Phan

## 2024-05-15 NOTE — LETTER
5/15/2024         RE: Noah Ennis  [address]  [facility] [address]        Dear Colleague,    Thank you for referring your patient, Noah Ennis, to the [facility] SP[facility]IN[facility] [place]. Please see a copy of my visit note below.    Patient is a   23  year old who is being evaluated via a billable telephone visit.      What phone number would you like to be contacted at? CELL  How would you like to obtain your AVS? MYCHART        Subjective   Patient is a   23  year old who presents by phone call visit for the following:     HPI   Followup for right knee MRI  Overall doing better  Spoke to mother  Matthew had GI disturbance from voltaren  Knee is starting to feel better with home exercises/PT    Review of Systems   Constitutional, HEENT, cardiovascular, pulmonary, gi and gu systems are negative, except as otherwise noted.      Objective           Vitals:  No vitals were obtained today due to virtual visit.    Physical Exam   healthy, alert, and no distress  PSYCH: Alert and oriented times 3; coherent speech, normal   rate and volume, able to articulate logical thoughts, able   to abstract reason, no tangential thoughts, no hallucinations   or delusions  His affect is normal  RESP: No cough, no audible wheezing, able to talk in full sentences  Remainder of exam unable to be completed due to telephone visits    Assessment/Plan  22 yo male with right knee pain due to patellofemoral syndrome, normal MRI      I independently reviewed the following imaging studies and discussed with patient:  Right knee MRI shows no abnormalities in cartilage or meniscus, ligaments normal  Discussed cont. HEP  Consider CSI  If not improving in 1-2 months or later  Discussed plan with mother          Phone call duration: 20 minutes  Phone call start: 300pm  Phone call end: 320pm  Dr Phan      Again, thank you for allowing me to participate in the care of your patient.        Sincerely,        Feliciano  Rudi Phan MD

## 2024-05-15 NOTE — LETTER
5/15/2024         RE: Noah Ennis  [address]  [facility] [address]        Dear Colleague,    Thank you for referring your patient, Noah Ennis, to the [facility] SPORTS MEDICINE CLINIC [place]. Please see a copy of my visit note below.    Patient is a   23  year old who is being evaluated via a billable telephone visit.      What phone number would you like to be contacted at? CELL  How would you like to obtain your AVS? MYCHART        Subjective   Patient is a   23  year old who presents by phone call visit for the following:     HPI   Followup for right knee MRI  Overall doing better  Spoke to mother  Matthew had GI disturbance from voltaren  Knee is starting to feel better with home exercises/PT    Review of Systems   Constitutional, HEENT, cardiovascular, pulmonary, gi and gu systems are negative, except as otherwise noted.      Objective           Vitals:  No vitals were obtained today due to virtual visit.    Physical Exam   healthy, alert, and no distress  PSYCH: Alert and oriented times 3; coherent speech, normal   rate and volume, able to articulate logical thoughts, able   to abstract reason, no tangential thoughts, no hallucinations   or delusions  His affect is normal  RESP: No cough, no audible wheezing, able to talk in full sentences  Remainder of exam unable to be completed due to telephone visits    Assessment/Plan  22 yo male with right knee pain due to patellofemoral syndrome, normal MRI      I independently reviewed the following imaging studies and discussed with patient:  Right knee MRI shows no abnormalities in cartilage or meniscus, ligaments normal  Discussed cont. HEP  Consider CSI  If not improving in 1-2 months or later  Discussed plan with mother          Phone call duration: 20 minutes  Phone call start: 300pm  Phone call end: 320pm  Dr Phan      Again, thank you for allowing me to participate in the care of your patient.        Sincerely,        Feliciano  Rudi Phan MD

## 2024-08-30 ENCOUNTER — TELEPHONE (OUTPATIENT)
Dept: PHARMACY | Facility: CLINIC | Age: 24
End: 2024-08-30
Payer: COMMERCIAL

## 2024-08-30 NOTE — TELEPHONE ENCOUNTER
MT Recruitment: [facility]     Referral outreach attempt #1 on August 30, 2024      Outcome: left voicemail- Call back number [phone]    Snow Jade CMA  [facility]

## 2024-09-11 ENCOUNTER — TELEPHONE (OUTPATIENT)
Dept: PHARMACY | Facility: OTHER | Age: 24
End: 2024-09-11
Payer: COMMERCIAL

## 2024-09-11 NOTE — TELEPHONE ENCOUNTER
MTM Recruitment: [facility] insurance     Referral outreach attempt #2 on September 11, 2024      Outcome: patient opted out    Snow Jade CMA  MTM

## 2024-09-21 ENCOUNTER — HEALTH MAINTENANCE LETTER (OUTPATIENT)
Age: 24
End: 2024-09-21

## 2025-02-03 ENCOUNTER — TELEPHONE (OUTPATIENT)
Dept: NEUROLOGY | Facility: CLINIC | Age: 25
End: 2025-02-03
Payer: COMMERCIAL

## 2025-02-03 NOTE — TELEPHONE ENCOUNTER
CMT RETURN PATIENT  Do you have any questions/concerns or new issues you would like to address at your appt? No new concerns except right knee is still bothering him, new pain in left knee. No longer seeing PT, did not feel this helped.   How has your mobility been since the last office visit? No new concerns   -Have you had an increase in falls or any mobility/balance concerns? 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 No   Sensory Loss  - Do you have loss of feeling or numbness anywhere in your feet or legs? No  - If so, does the loss of feeling extend above your toes? N/a  - Does it extend above the ankle? N/a  - Can you identify the point where the sensation becomes normal or nearly normal? N/a   - Are these symptoms constant (present all the time), present most of the daytime, less than one-half of the daytime, or just occasionally (Daytime is defined as the time between getting up and going to bed)? N/a    Motor Symptoms- Legs  - Do you have weakness in your legs or feet? Yes   - Do you ever trip over your toes/feet or turn or sprain your ankles? No   - Do your feet slap down on the ground when you walk? Yes   - Do you wear shoe inserts/insoles (below the ankle)? Yes   - Do you wear braces, splints or an equivalent type of orthotic that extends above your ankle? No   - Have the above ankle orthotics described above ever been prescribed or suggested by a healthcare professional? No   - Have you ever had surgery on your feet or ankles? No   - If so, do you know if the surgery involved fusion of bones, a transfer of tendons, heel cord lengthening or lowering of the arch? N/a  - Do you use a cane, walking stick, or walker to help you walk most of the time outside the home? Occasionally uses stick If not, do you feel adaptive equipment would be beneficial? May benefit from an official walking stick  - Do you use a wheelchair most of the  "time because of weakness? No   Motor Symptoms- Arms  - Do you have difficulty with buttoning clothes (standard shirt buttons)? No   - If yes, are the difficulties mild or severe (severe includes unable)? N/a  - Can you cut most food including meat and pizza with normal utensils? Yes  - Do you have difficulty with activities that require extending or flexing your arms, or activities using your upper arms? Yes- Arms tire quickly, difficulty reaching over head           -Do you have any difficulty with gripping or pinching object? No          -Do you have any hand splints that you currently wear or have these ever been used in the past (if yes, please ask that they bring these to the appt)? No    We will have a  available in clinic. Would you be interested in discussing any of the following topics: No   - Initiating the disability process  -Transportation issues  -Financial concerns   - Other community resources  Are you registered with the MDA ([employer]cular [employer]yst[employer])? Yes   To determine if you qualify for any CMT research studies, would you be interested in meeting with the research coordinator? No   \"We will contact you once the schedule has been completed to let you know what time you need to arrive. Disregard the automated appointment reminder call, I will call you directly to let you know what time to be here.\"    Recap of services needed: PT, Orthotics, Occupational Therapy    Bhavya COTA RN, BSN  [facility]      "

## 2025-02-11 ENCOUNTER — THERAPY VISIT (OUTPATIENT)
Dept: PHYSICAL THERAPY | Facility: CLINIC | Age: 25
End: 2025-02-11
Payer: COMMERCIAL

## 2025-02-11 ENCOUNTER — THERAPY VISIT (OUTPATIENT)
Dept: OCCUPATIONAL THERAPY | Facility: CLINIC | Age: 25
End: 2025-02-11
Payer: COMMERCIAL

## 2025-02-11 ENCOUNTER — DOCUMENTATION ONLY (OUTPATIENT)
Dept: ORTHOPEDICS | Facility: CLINIC | Age: 25
End: 2025-02-11

## 2025-02-11 ENCOUNTER — OFFICE VISIT (OUTPATIENT)
Dept: NEUROLOGY | Facility: CLINIC | Age: 25
End: 2025-02-11
Payer: COMMERCIAL

## 2025-02-11 VITALS
HEART RATE: 94 BPM | SYSTOLIC BLOOD PRESSURE: 117 MMHG | BODY MASS INDEX: 22.36 KG/M2 | HEIGHT: 70 IN | DIASTOLIC BLOOD PRESSURE: 72 MMHG | WEIGHT: 156.2 LBS

## 2025-02-11 DIAGNOSIS — G89.29 CHRONIC PAIN OF RIGHT KNEE: Primary | ICD-10-CM

## 2025-02-11 DIAGNOSIS — G60.0 CMT (CHARCOT-MARIE-TOOTH DISEASE): ICD-10-CM

## 2025-02-11 DIAGNOSIS — M25.519 SHOULDER PAIN: ICD-10-CM

## 2025-02-11 DIAGNOSIS — G60.0 CMT (CHARCOT-MARIE-TOOTH DISEASE): Primary | ICD-10-CM

## 2025-02-11 DIAGNOSIS — M25.561 CHRONIC PAIN OF RIGHT KNEE: Primary | ICD-10-CM

## 2025-02-11 PROCEDURE — 97110 THERAPEUTIC EXERCISES: CPT | Mod: GP | Performed by: PHYSICAL THERAPIST

## 2025-02-11 PROCEDURE — 97161 PT EVAL LOW COMPLEX 20 MIN: CPT | Mod: GP | Performed by: PHYSICAL THERAPIST

## 2025-02-11 NOTE — PROGRESS NOTES
Noah Ennis is a 24 year old individual with a diagnosis of CMT1B. Interval history: right knee pain has recurred. Sharp pain in thighs [numb, tired] when bending forward. No new sensory or motor symptoms otherwise.     See pre-visit call for interval history.    CMTES score: 0     0 1 2 3 4   Sensory symptoms None Below or at ankle bones Symptoms up to the distal half of the calf Symptoms up to the proximal half of the calf, including knee Symptoms above knee (above the top of the patella)   Motor symptoms - legs None  Trips, catches toes, slaps feet, shoe inserts Ankle support or stabilization needed most of the time for ambulation Walking aids (cane, walker) needed most of the time Wheelchair most of the time   Motor symptoms - arms None Mild difficulty with buttons Severe difficulty or unable to do buttons Unable to cut most foods Proximal weakness (affect movements involving the elbow and above)   Pin sensibility Normal Decreased below or at ankle bones Decreased up to the distal half of the calf Decreased up to the proximal half of the calf, including knee Decreased above knee (above the  top of the patella)   Vibration  Normal Reduced at great toe Reduced at ankle Reduced at knee (tibial tuberosity) Absent at knee and ankle   Strength - legs Normal 4+, 4, or 4- on foot dorsi- or plantarflexion </= 3 on foot dorsi- or plantarflexion </= 3 on foot dorsi- and plantarflexion Proximal weakness   Strength - arms Normal 4+, 4, or 4- on intrinsic hand muscles </= 3 on intrinsic hand muscles < 5 on wrist extensors Weak above elbow   Ulnar CMAP (Median)        Radial SNAP             Examination    Foot and MSK exam:  Calluses left plantar surface. Plantigrade. Gait normal. Skin intact.        Sensory examination:     Right Left   Vibration (Rydell-Seiffer) 6 7   Pin Normal Normal   Legend:   MM = medial malleolus, TT = tibial tuberosity, K = patella, MCP = MCP joint  MF = mid-foot, DC = distal calf, MC = mid  calf, PC = proximal calf      Motor examination:     Right Left   Shoulder abduction  5 5   Elbow extension 5 5   Elbow flexion 5 5   Wrist extension  5 5   Finger extension 5 5   FDI 5 5   APB 5 5   Hip flexion 5 5   Knee flexion 5 5   Knee extension 5 5   Dorsiflexion 5 5   Plantar flexion 5 5   A=atrophy      Gait:  Normal.      Impression:  Interval change: no change  Falls: none  ADLs: see OT note  Genetic testing: CMT 1B  Pain: right knee. Will refer to orthopedics [initial evaluation and treatment with sports medicine here]  MSK/foot: no concerns  CMT education and research: not reviewed in detail today    Recommendations:  I made a referral to [facility].  No significant changes in your examination today.  Follow up with recommendations from our therapists and orthotist.  We will contact you if there are new clinical trials in CMT1B.      Feliciano Em M.D.    The longitudinal plan of care for CMT was addressed during this visit. Due to the added complexity in care, I will continue to support Noah Ennis in the subsequent management of this condition and with the ongoing continuity of care of this condition.     16 minutes spent on the date of the encounter on chart review, history and examination, documentation and further activities as noted above.

## 2025-02-11 NOTE — NURSING NOTE
"Noah Ennis's goals for this visit include: No chief complaint on file.      He requests these members of his care team be copied on today's visit information: yes    PCP: Oneida Mclaughlin    Referring Provider:  Referred Self, MD  No address on file    /72 (BP Location: Right arm, Patient Position: Sitting, Cuff Size: Adult Regular)   Pulse 94   Ht 1.765 m (5' 9.5\")   Wt 70.9 kg (156 lb 3.2 oz)   BMI 22.74 kg/m      Do you need any medication refills at today's visit? No  DELMI Langley., CMA (AAMA)      "

## 2025-02-11 NOTE — PROGRESS NOTES
S. Pt. was seen today, at Dr. Em's CMT clinic in [place], with his Mother, for an evaluation for lower extremity bracing.    O.Goal. To help stabilize ankles and give proper support.    A. Pt. currently has custom made FO's that had worked for him well in the past but is due to new orthotics due to general wear. His current fo's are appox. 1 year old. He is an active 24 year old male that wears size 9.5W tennis shoes. He is in need of fo's to help stabilize his ankles. He is starting to have bilateral medial knee pain. For this, I have taken new biofoam impressions and will have new custom fo's fabricated for him. The impressions have been sent to our main lab for fabrication. I have requested an Rx. from Dr. Em.  I will be off-loading calluses on the left foot.    P. Pt. is scheduled to arrive back to our location in approx. 1 months for the fitting. Pt. has been instructed to contact our facility with any future questions and/or concerns.    Good CAN/MICHAEL

## 2025-02-11 NOTE — PROGRESS NOTES
OCCUPATIONAL THERAPY EVALUATION  Type of Visit: Evaluation        Fall Risk Screen:  Fall screen completed by: PT  Have you fallen 2 or more times in the past year?: No  Have you fallen and had an injury in the past year?: No  Timed Up and Go score (seconds): 6.89 seconds without device  Is patient a fall risk?: Yes; Department fall risk interventions implemented    Subjective        Presenting condition or subjective complaint:    Date of onset: 02/11/25    Relevant medical history:     Dates & types of surgery:      Prior diagnostic imaging/testing results:       Prior therapy history for the same diagnosis, illness or injury:          Living Environment  Social support:   lives with family  Type of home:     Stairs to enter the home:         Ramp:     Stairs inside the home:         Help at home:  family  Equipment owned:       Employment:    NA  Hobbies/Interests:  [employer]    Pain assessment:  muscle pain in B upper arms when reaching up, shoulder height and above      Objective     SENSATION:  normal sensation, but occasionally hands fall asleep if elbow is bent too long    POSTURE:  no atrophy or deformity of either hand  RANGE OF MOTION: UE AROM WNL, tightness and soreness in B biceps, deltoid and rotator cuff with raising to shoulder height and above.   STRENGTH: : R 87  L  96 Lat Pinch R 27  L 25  3pt pinch R 20  L  16  UE strength 5/5 bilaterally    FUNCTIONAL MOBILITY  Assistive Device(s): None  Ambulation: indep    BADLs: independent    ACTIVITY TOLERANCE: gets tired walking up steps or carrying items ,  if he does too much he will be worn out the next day.    INSTRUMENTAL ACTIVITIES OF DAILY LIVING (IADL):   Meal Planning/Prep: splits cooking with mom  Home/Financial Management: does vacuuming, caring for dogs, dishes, laundry. Mom does finances.  Communication/Computer Use: uses both without difficulty, hands get cramped with playing video games on computer  Community Mobility: doesn't drive or  work  Care of Others:- cares for dogs    Assessment & Plan   CLINICAL IMPRESSIONS  Medical Diagnosis: CMT    Treatment Diagnosis: Arm pain    Impression/Assessment: Pt is a 24 year old male presenting to Occupational Therapy due to Arm pain.  The following significant findings have been identified: Impaired ROM, Impaired strength, and Pain.  These identified deficits interfere with their ability to perform recreational activities and household chores as compared to previous level of function.     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  Rationale: In order to maximize safety and independence with ADL/IADLs  Goal Progress: goal met  Target Date: 02/11/25  Date Met: 02/11/25      Frequency of Treatment: one time visit  Duration of Treatment: one time visit     Recommended Referrals to Other Professionals: Physical Therapy for shoulder/upper arm pain. Pt was given some theraband ex today, but could use more PT to further eval and tx shoulder.   Education Assessment: Learner/Method: Patient;Family;Listening;Reading;Demonstration     Risks and benefits of evaluation/treatment have been explained.   Patient/Family/caregiver agrees with Plan of Care.     Evaluation Time:    OT Eval, Low Complexity Minutes (11111): 30    Signing Clinician: BRANDON Rodriguez [facility]                                                                                   OUTPATIENT OCCUPATIONAL THERAPY      PLAN OF TREATMENT FOR OUTPATIENT REHABILITATION   Patient's Last Name, First Name,  CIARRA EnnisNoah  Iggy YOB: 2000   Provider's Name   [facility]   Medical Record No.  [id number]     Onset Date: 02/11/25 Start of Care Date: 02/11/25     Medical Diagnosis:  CMT      OT Treatment Diagnosis:  Arm pain Plan of Treatment  Frequency/Duration:one time visit/one time visit    Certification date from 02/11/25   To 02/11/25        See note for plan of treatment details and functional goals     BRANDON Rodriguez                         I CERTIFY THE NEED FOR THESE SERVICES FURNISHED UNDER        THIS PLAN OF TREATMENT AND WHILE UNDER MY CARE     (Physician attestation of this document indicates review and certification of the therapy plan).              Referring Provider:  Feliciano Em    Initial Assessment  See Epic Evaluation- 02/11/25

## 2025-02-11 NOTE — PATIENT INSTRUCTIONS
I made a referral to [facility].  No significant changes in your examination today.  Follow up with recommendations from our therapists and orthotist.  We will contact you if there are new clinical trials in CMT1B.

## 2025-02-11 NOTE — LETTER
2/11/2025      Noah Ennis  [address] [facility] [address]      Dear Colleague,    Thank you for referring your patient, Noah Ennis, to the [facility] NEUROLOGY [facility] [place]. Please see a copy of my visit note below.    Noah Ennis is a 24 year old individual with a diagnosis of CMT1B. Interval history: right knee pain has recurred. Sharp pain in thighs [numb, tired] when bending forward. No new sensory or motor symptoms otherwise.     See pre-visit call for interval history.    CMTES score: 0     0 1 2 3 4   Sensory symptoms None Below or at ankle bones Symptoms up to the distal half of the calf Symptoms up to the proximal half of the calf, including knee Symptoms above knee (above the top of the patella)   Motor symptoms - legs None  Trips, catches toes, slaps feet, shoe inserts Ankle support or stabilization needed most of the time for ambulation Walking aids (cane, walker) needed most of the time Wheelchair most of the time   Motor symptoms - arms None Mild difficulty with buttons Severe difficulty or unable to do buttons Unable to cut most foods Proximal weakness (affect movements involving the elbow and above)   Pin sensibility Normal Decreased below or at ankle bones Decreased up to the distal half of the calf Decreased up to the proximal half of the calf, including knee Decreased above knee (above the  top of the patella)   Vibration  Normal Reduced at great toe Reduced at ankle Reduced at knee (tibial tuberosity) Absent at knee and ankle   Strength - legs Normal 4+, 4, or 4- on foot dorsi- or plantarflexion </= 3 on foot dorsi- or plantarflexion </= 3 on foot dorsi- and plantarflexion Proximal weakness   Strength - arms Normal 4+, 4, or 4- on intrinsic hand muscles </= 3 on intrinsic hand muscles < 5 on wrist extensors Weak above elbow   Ulnar CMAP (Median)        Radial SNAP             Examination    Foot and MSK exam:  Calluses left plantar surface. Plantigrade.  Gait normal. Skin intact.        Sensory examination:     Right Left   Vibration (Rydell-Seiffer) 6 7   Pin Normal Normal   Legend:   MM = medial malleolus, TT = tibial tuberosity, K = patella, MCP = MCP joint  MF = mid-foot, DC = distal calf, MC = mid calf, PC = proximal calf      Motor examination:     Right Left   Shoulder abduction  5 5   Elbow extension 5 5   Elbow flexion 5 5   Wrist extension  5 5   Finger extension 5 5   FDI 5 5   APB 5 5   Hip flexion 5 5   Knee flexion 5 5   Knee extension 5 5   Dorsiflexion 5 5   Plantar flexion 5 5   A=atrophy      Gait:  Normal.      Impression:  Interval change: no change  Falls: none  ADLs: see OT note  Genetic testing: CMT 1B  Pain: right knee. Will refer to orthopedics [initial evaluation and treatment with sports medicine here]  MSK/foot: no concerns  CMT education and research: not reviewed in detail today    Recommendations:  I made a referral to [facility].  No significant changes in your examination today.  Follow up with recommendations from our therapists and orthotist.  We will contact you if there are new clinical trials in CMT1B.      Feliciano Em M.D.    The longitudinal plan of care for CMT was addressed during this visit. Due to the added complexity in care, I will continue to support Noah Ennis in the subsequent management of this condition and with the ongoing continuity of care of this condition.     16 minutes spent on the date of the encounter on chart review, history and examination, documentation and further activities as noted above.         Again, thank you for allowing me to participate in the care of your patient.        Sincerely,    Feliciano Em MD    Electronically signed

## 2025-02-11 NOTE — PROGRESS NOTES
PHYSICAL THERAPY EVALUATION  Type of Visit: Evaluation and Treatment     Fall Risk Screen:  Fall screen completed by: PT  Have you fallen 2 or more times in the past year?: No  Have you fallen and had an injury in the past year?: No  Timed Up and Go score (seconds): 6.89 seconds without device  Is patient a fall risk?: Yes; Department fall risk interventions implemented    Subjective         Presenting condition or subjective complaint:  patient returns with his mother to CMT clinic; reports difficulty with stairs & squatting due to bilateral knee pain, right > left  Date of onset: 25 (CMT diagnosed )    Relevant medical history:  asthma; autism; ADHD  Dates & types of surgery:  none    Living Environment  Social support:   Lives with parents   Type of home:   Multi-level house   Stairs to enter the home:       4 steps, no rail  Ramp:   none  Stairs inside the home:       12 steps, left rail ascending  Help at home:  [employer] worker 1x/week  Equipment owned:   foot orthoses    Employment:    no currently working  Hobbies/Interests:  bowling, basketball, fishing, kayaking, computer games, photography, gardening    Pain assessment: Pain present  Location: knees/Ratin/10 at best; 5/10 at worst  Location: arms (with overhead reaching)/Ratin/10 at best; 4/10 at worst     Objective    NEURO CLINIC EVALUATION    Others present at visit: Parent(s) - his mother    Evaluation   Interview completed.     Range of motion: mild hamstring tightness bilaterally        Manual muscle testing: hip flexion & knee extension 5/5 bilaterally; knee flex 4/5 bilaterally; ankle dorsiflexion 4+/5 bilaterally; ankle plantar flexion 4/5 on right, 4-/5 on left     Gait: independent without device; exhibits mild increase in base of support, good foot clearance & step symmetry bilaterally      25 ft timed walk: 10 steps in 5.19 seconds without device     10 MWT: 5.25 seconds without device; gait speed = 1.14 m/sec      Treatment  provided this date:   Therapeutic procedures, 10 minutes    Response to treatment/recommendations:   Reviewed results of PT exam with patient and his mother. Provided education regarding importance of daily stretching program to assist with managing symptoms of CMT. Instructed in seated hamstring stretch, standing gastroc stretch & seated soleus stretch. Demonstrated each stretch for patient & his mother. Provided written instructions with illustrations and had patient return demonstration of each stretch to PT. Requires intermittent verbal cues from his mom to ensure correct stretching techniques. Recommend OP PT closer to patient's home to address bilateral knee pain; patient and his mother in agreement with this recommendation.      Goal attainment:  All goals met    Total Evaluation Time (Minutes): 30 min  Timed Code Treatment Minutes: 10 min  Total Treatment Time (sum of timed and untimed services): 40 min    Assessment & Plan   CLINICAL IMPRESSIONS  Medical Diagnosis: CMT    Treatment Diagnosis: Gait difficulty   Impression/Assessment: Patient is a 24 year old male referred for PT visit in CMT clinic.  The following significant findings have been identified: Pain, Decreased ROM/flexibility, Decreased strength, and Impaired gait. These impairments interfere with their ability to perform recreational activities, household mobility, and community mobility as compared to previous level of function.     Clinical Decision Making (Complexity):  Clinical Presentation: Evolving/Changing  Clinical Presentation Rationale: based on medical and personal factors listed in PT evaluation  Clinical Decision Making (Complexity): Low complexity    PLAN OF CARE  Treatment Interventions:  Interventions: Therapeutic Exercise    Long Term Goals     PT Goal 1  Goal Description: Patient and his mother will verbalize understanding of stretching program for management of CMT related symptoms and patient will demonstrate correct  stretching techniques with verbal cues from mom  Target Date: 02/11/25  Date Met: 02/11/25      Frequency of Treatment: 1 visit only  Duration of Treatment: 1 visit only    Recommended Referrals to Other Professionals: Occupational Therapy  Education Assessment:   Learner/Method: Patient;Family;Listening;Demonstration;Pictures/Video  Education Comments: Patient with autism; requires vebal cues from mom for correct technique    Risks and benefits of evaluation/treatment have been explained.   Patient/Family/caregiver agrees with Plan of Care.     Evaluation Time:     PT Eval, Low Complexity Minutes (62668): 30     Signing Clinician: Vicky Terrazas, PT        [facility]                                                                                   OUTPATIENT PHYSICAL THERAPY      PLAN OF TREATMENT FOR OUTPATIENT REHABILITATION   Patient's Last Name, First Name, Noah Dunbar YOB: 2000   Provider's Name   [facility]   Medical Record No.  [id number]     Onset Date: 02/03/25 (CMT diagnosed 2015)  Start of Care Date: 02/11/25     Medical Diagnosis:  CMT      PT Treatment Diagnosis:  Gait difficulty Plan of Treatment  Frequency/Duration: 1 visit only/ 1 visit only    Certification date from 02/11/25 to 02/11/25         See note for plan of treatment details and functional goals     Vicky Terrazas, PT                         I CERTIFY THE NEED FOR THESE SERVICES FURNISHED UNDER        THIS PLAN OF TREATMENT AND WHILE UNDER MY CARE     (Physician attestation of this document indicates review and certification of the therapy plan).              Referring Provider:  Feliciano Em    Initial Assessment  See Epic Evaluation- Start of Care Date: 02/11/25                 Poor

## 2025-02-12 ENCOUNTER — PATIENT OUTREACH (OUTPATIENT)
Dept: CARE COORDINATION | Facility: CLINIC | Age: 25
End: 2025-02-12
Payer: COMMERCIAL

## 2025-03-10 ASSESSMENT — ACTIVITIES OF DAILY LIVING (ADL)
STIFFNESS: THE SYMPTOM AFFECTS MY ACTIVITY MODERATELY
PLEASE_INDICATE_YOR_PRIMARY_REASON_FOR_REFERRAL_TO_THERAPY:: KNEE
GO DOWN STAIRS: ACTIVITY IS VERY DIFFICULT
STAND: ACTIVITY IS FAIRLY DIFFICULT
STAND: ACTIVITY IS FAIRLY DIFFICULT
AS_A_RESULT_OF_YOUR_KNEE_INJURY,_HOW_WOULD_YOU_RATE_YOUR_CURRENT_LEVEL_OF_DAILY_ACTIVITY?: ABNORMAL
GIVING WAY, BUCKLING OR SHIFTING OF KNEE: THE SYMPTOM AFFECTS MY ACTIVITY MODERATELY
HOW_WOULD_YOU_RATE_THE_CURRENT_FUNCTION_OF_YOUR_KNEE_DURING_YOUR_USUAL_DAILY_ACTIVITIES_ON_A_SCALE_FROM_0_TO_100_WITH_100_BEING_YOUR_LEVEL_OF_KNEE_FUNCTION_PRIOR_TO_YOUR_INJURY_AND_0_BEING_THE_INABILITY_TO_PERFORM_ANY_OF_YOUR_USUAL_DAILY_ACTIVITIES?: 50
STIFFNESS: THE SYMPTOM AFFECTS MY ACTIVITY MODERATELY
GO UP STAIRS: ACTIVITY IS VERY DIFFICULT
LIMPING: I HAVE THE SYMPTOM BUT IT DOES NOT AFFECT MY ACTIVITY
KNEE_ACTIVITY_OF_DAILY_LIVING_SUM: 35
RISE FROM A CHAIR: ACTIVITY IS SOMEWHAT DIFFICULT
KNEEL ON THE FRONT OF YOUR KNEE: ACTIVITY IS FAIRLY DIFFICULT
SQUAT: ACTIVITY IS FAIRLY DIFFICULT
SIT WITH YOUR KNEE BENT: ACTIVITY IS SOMEWHAT DIFFICULT
GO UP STAIRS: ACTIVITY IS VERY DIFFICULT
WALK: ACTIVITY IS SOMEWHAT DIFFICULT
PAIN: THE SYMPTOM AFFECTS MY ACTIVITY MODERATELY
WALK: ACTIVITY IS SOMEWHAT DIFFICULT
HOW_WOULD_YOU_RATE_THE_CURRENT_FUNCTION_OF_YOUR_KNEE_DURING_YOUR_USUAL_DAILY_ACTIVITIES_ON_A_SCALE_FROM_0_TO_100_WITH_100_BEING_YOUR_LEVEL_OF_KNEE_FUNCTION_PRIOR_TO_YOUR_INJURY_AND_0_BEING_THE_INABILITY_TO_PERFORM_ANY_OF_YOUR_USUAL_DAILY_ACTIVITIES?: 50
SIT WITH YOUR KNEE BENT: ACTIVITY IS SOMEWHAT DIFFICULT
SQUAT: ACTIVITY IS FAIRLY DIFFICULT
GIVING WAY, BUCKLING OR SHIFTING OF KNEE: THE SYMPTOM AFFECTS MY ACTIVITY MODERATELY
GO DOWN STAIRS: ACTIVITY IS VERY DIFFICULT
WEAKNESS: THE SYMPTOM AFFECTS MY ACTIVITY SLIGHTLY
WEAKNESS: THE SYMPTOM AFFECTS MY ACTIVITY SLIGHTLY
KNEE_ACTIVITY_OF_DAILY_LIVING_SCORE: 50
RAW_SCORE: 35
AS_A_RESULT_OF_YOUR_KNEE_INJURY,_HOW_WOULD_YOU_RATE_YOUR_CURRENT_LEVEL_OF_DAILY_ACTIVITY?: ABNORMAL
PAIN: THE SYMPTOM AFFECTS MY ACTIVITY MODERATELY
SWELLING: I DO NOT HAVE THE SYMPTOM
RISE FROM A CHAIR: ACTIVITY IS SOMEWHAT DIFFICULT
KNEEL ON THE FRONT OF YOUR KNEE: ACTIVITY IS FAIRLY DIFFICULT
SWELLING: I DO NOT HAVE THE SYMPTOM
LIMPING: I HAVE THE SYMPTOM BUT IT DOES NOT AFFECT MY ACTIVITY

## 2025-03-13 ENCOUNTER — THERAPY VISIT (OUTPATIENT)
Dept: PHYSICAL THERAPY | Facility: CLINIC | Age: 25
End: 2025-03-13
Attending: PSYCHIATRY & NEUROLOGY
Payer: COMMERCIAL

## 2025-03-13 DIAGNOSIS — G89.29 CHRONIC PAIN OF RIGHT KNEE: Primary | ICD-10-CM

## 2025-03-13 DIAGNOSIS — M25.561 CHRONIC PAIN OF RIGHT KNEE: Primary | ICD-10-CM

## 2025-03-13 NOTE — PROGRESS NOTES
PHYSICAL THERAPY EVALUATION  Type of Visit: Evaluation        Fall Risk Screen:  Fall screen completed by: PT  Have you fallen 2 or more times in the past year?: No  Have you fallen and had an injury in the past year?: No  Timed Up and Go score (seconds): 6.89 seconds without device  Is patient a fall risk?: Yes; Department fall risk interventions implemented    Subjective         Presenting condition or subjective complaint: Right knee pain  Date of onset: 02/11/25    Relevant medical history: Asthma; Concussions; Foot drop   Dates & types of surgery:      Prior diagnostic imaging/testing results: X-ray     Prior therapy history for the same diagnosis, illness or injury: Yes PT over 6 months ago.    Prior Level of Function  Transfers:   Ambulation:   ADL:   IADL:     Living Environment  Social support: With family members   Type of home: House; 2-story   Stairs to enter the home: Yes 3 Is there a railing: No     Ramp: No   Stairs inside the home: Yes 15 Is there a railing: Yes     Help at home: Medication and/or finances; Meals on wheels/meal service  Equipment owned:       Employment: No    Hobbies/Interests:      Patient goals for therapy: stairs    PHYSICAL THERAPY KNEE EVALUATION    SUBJECTIVE:  Noah is a 24 year old male who reports that whenever he bends his knee, he feels the bones scrapping together. When he walks there is more pain in the knee. It's getting worse overtime.  Stairs are painful and worse. Just walking is painful. He has tried heat, ice, ibuprofen, tylenol, a sleeve, a sleeve with the hole and nothing helped    Patient reports symptoms of:  Pain, Stiffness / Tightness, Weakness, and Gait Impairments. Reports knee buckling    Patient report of Pain:  Pain Rating Now: 4/10  Pain Rating at Best: 2/10  Pain Rating at Worst: 7/10  Pain Location: Anterior knee around patella  Pain Quality/Description: Stiff, Tight, Dull, Constant, Sharp Pain  Pain Better with: Rest  Pain Worse with: Stairs,  Walking, Use  Progression of Symptoms: Worsening    Patient reports Red Flags symptoms of:  None    OBJECTIVE:  Seated Lower Extremity Manual Muscle Test / Strength Assessment:  Hip Flexion: Right Hip 4+/5 (Caused hip flexor/TFL tightness/fatigue) , Left Hip 5/5  Knee Extension: Right Knee 4+/5 (Caused hip flexor/TFL tightness/fatigue), Left Knee 5/5  Knee Flexion: Right Knee 4+/5 (Caused hip flexor/TFL tightness/fatigue), Left Knee 5/5  Ankle Dorsiflexion: Right Ankle 4+/5, Left Ankle 5/5  Supine SLR Quad Endurance Test: Right Lower Extremity 6 Reps and limited due to hip flexor/TFL fatigue,  Left Lower Extremity 25 Reps and limited due to hip flexor/TFL fatique    Supine Knee Heelslide Active ROM  Right Knee:   Knee Flexion: 145 degrees  Knee Extension 0 degrees  Left Knee:  Knee Flexion: 145 degrees  Knee Extension: 0 degrees    Knee Special Tests:  Genu Valgus Stress Test: Negative  Genu Varus Stress Test: Negative  Lachman's Test: Negative  Posterior Drawer Test: Negative  Gary's Test: Negative  Patellar Compression Test: Negative  DL Squat Test: Genu Valgus Present Right worse than Left, Hypermobility Present and reproduced right peripatellar knee pain       ASSESSMENT:  Noah is a 24 year old male referred to Physical Therapy for Chronic Right Knee Pain   from Feliciano Em.  Noah demonstrates findings of Pain, Weakness, and Motion Loss that justify a need for formal Physical Therapy. These impairments interfere with their ability to perform self care tasks, work tasks, recreational activities, household chores, driving , household mobility, and community mobility as compared to their previous level of function.    Medical Diagnosis: Chronic Right Knee Pain    Treatment Diagnosis: Chronic Right Knee Pain     Clinical Decision Making (Complexity):  Clinical Presentation: Stable/Uncomplicated  Clinical Presentation Rationale: based on medical and personal factors listed in PT evaluation  Clinical  Decision Making (Complexity): Low complexity      PHYSICAL THERAPY PLAN OF CARE:  Treatment Interventions:  Modalities: Cryotherapy, E-stim, Hot Pack, Ultrasound  Interventions: Gait Training, Manual Therapy, Neuromuscular Re-education, Therapeutic Activity, Therapeutic Exercise    Long Term Goals     PT Goal 1  Goal Identifier: Stairs  Goal Description: Noah will be able to ascend/descend 2 flights of stairs without knee pain  Rationale: to maximize safety and independence with performance of ADLs and functional tasks;to maximize safety and independence within the home;to maximize safety and independence within the community;to maximize safety and independence with transportation;to maximize safety and independence with self cares  Goal Progress: Gets 8/10 knee pain ascending and descending stairs  Target Date: 06/05/25    Frequency of Treatment: 2x a month  Duration of Treatment: 3 months         Risks and benefits of evaluation/treatment have been explained.   Patient/Family/caregiver agrees with Plan of Care.      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 Em. Noah does have a significant PMHx of [place].  Based on his therapy evaluation, he has quadriceps weakness with secondary TFL/Hip Flexor compensator patterns consistent with PFPS. He also primarily has pain the knee going up and down the stairs consistent with PFPS. Therefor, we will start with a quadriceps strengthening program coupled with a hip abductor strengthening for genu valgus.  We will progress and modify as needed based on response.          [facility]                                                                                   OUTPATIENT PHYSICAL  THERAPY      PLAN OF TREATMENT FOR OUTPATIENT REHABILITATION   Patient's Last Name, First Name, Noah Dunbar YOB: 2000   Provider's Name   [facility]   Medical Record No.  [id number]     Onset Date: 02/11/25  Start of Care Date: 03/13/25     Medical Diagnosis:  Chronic Right Knee Pain      PT Treatment Diagnosis:  Chronic Right Knee Pain Plan of Treatment  Frequency/Duration: 2x a month/ 3 months    Certification date from 03/13/25 to 06/05/25         See note for plan of treatment details and functional goals     Ian Gallagher, PT                         I CERTIFY THE NEED FOR THESE SERVICES FURNISHED UNDER        THIS PLAN OF TREATMENT AND WHILE UNDER MY CARE     (Physician attestation of this document indicates review and certification of the therapy plan).              Referring Provider:  Feliciano Em MD    Initial Assessment  See Epic Evaluation- Start of Care Date: 03/13/25

## 2025-04-02 ENCOUNTER — VIRTUAL VISIT (OUTPATIENT)
Dept: PHYSICAL THERAPY | Facility: CLINIC | Age: 25
End: 2025-04-02
Payer: COMMERCIAL

## 2025-04-02 DIAGNOSIS — M25.561 CHRONIC PAIN OF RIGHT KNEE: Primary | ICD-10-CM

## 2025-04-02 DIAGNOSIS — G89.29 CHRONIC PAIN OF RIGHT KNEE: Primary | ICD-10-CM

## 2025-04-02 PROCEDURE — 97110 THERAPEUTIC EXERCISES: CPT | Mod: GP

## 2025-05-21 ENCOUNTER — VIRTUAL VISIT (OUTPATIENT)
Dept: PHYSICAL THERAPY | Facility: CLINIC | Age: 25
End: 2025-05-21
Payer: COMMERCIAL

## 2025-05-21 DIAGNOSIS — M25.561 CHRONIC PAIN OF RIGHT KNEE: Primary | ICD-10-CM

## 2025-05-21 DIAGNOSIS — G89.29 CHRONIC PAIN OF RIGHT KNEE: Primary | ICD-10-CM

## 2025-05-21 PROCEDURE — 97110 THERAPEUTIC EXERCISES: CPT | Mod: GP

## 2025-05-21 ASSESSMENT — ACTIVITIES OF DAILY LIVING (ADL)
WALK: ACTIVITY IS NOT DIFFICULT
KNEE_ACTIVITY_OF_DAILY_LIVING_SUM: 55
RISE FROM A CHAIR: ACTIVITY IS NOT DIFFICULT
STIFFNESS: I HAVE THE SYMPTOM BUT IT DOES NOT AFFECT MY ACTIVITY
SWELLING: I DO NOT HAVE THE SYMPTOM
LIMPING: I DO NOT HAVE THE SYMPTOM
GIVING WAY, BUCKLING OR SHIFTING OF KNEE: THE SYMPTOM AFFECTS MY ACTIVITY SLIGHTLY
STAND: ACTIVITY IS SOMEWHAT DIFFICULT
AS_A_RESULT_OF_YOUR_KNEE_INJURY,_HOW_WOULD_YOU_RATE_YOUR_CURRENT_LEVEL_OF_DAILY_ACTIVITY?: NEARLY NORMAL
HOW_WOULD_YOU_RATE_THE_CURRENT_FUNCTION_OF_YOUR_KNEE_DURING_YOUR_USUAL_DAILY_ACTIVITIES_ON_A_SCALE_FROM_0_TO_100_WITH_100_BEING_YOUR_LEVEL_OF_KNEE_FUNCTION_PRIOR_TO_YOUR_INJURY_AND_0_BEING_THE_INABILITY_TO_PERFORM_ANY_OF_YOUR_USUAL_DAILY_ACTIVITIES?: 55
KNEEL ON THE FRONT OF YOUR KNEE: ACTIVITY IS SOMEWHAT DIFFICULT
SIT WITH YOUR KNEE BENT: ACTIVITY IS NOT DIFFICULT
PAIN: THE SYMPTOM AFFECTS MY ACTIVITY SLIGHTLY
RAW_SCORE: 55
WEAKNESS: THE SYMPTOM AFFECTS MY ACTIVITY SLIGHTLY
KNEE_ACTIVITY_OF_DAILY_LIVING_SCORE: 78.57
GO UP STAIRS: ACTIVITY IS SOMEWHAT DIFFICULT
HOW_WOULD_YOU_RATE_THE_OVERALL_FUNCTION_OF_YOUR_KNEE_DURING_YOUR_USUAL_DAILY_ACTIVITIES?: NEARLY NORMAL
GO DOWN STAIRS: ACTIVITY IS SOMEWHAT DIFFICULT
SQUAT: ACTIVITY IS NOT DIFFICULT

## 2025-06-25 ENCOUNTER — VIRTUAL VISIT (OUTPATIENT)
Dept: PHYSICAL THERAPY | Facility: CLINIC | Age: 25
End: 2025-06-25
Payer: MEDICAID

## 2025-06-25 DIAGNOSIS — G89.29 CHRONIC PAIN OF RIGHT KNEE: Primary | ICD-10-CM

## 2025-06-25 DIAGNOSIS — M25.561 CHRONIC PAIN OF RIGHT KNEE: Primary | ICD-10-CM

## 2025-06-25 PROCEDURE — 97110 THERAPEUTIC EXERCISES: CPT | Mod: GP

## 2025-06-25 PROCEDURE — 97530 THERAPEUTIC ACTIVITIES: CPT | Mod: GP

## 2025-06-25 ASSESSMENT — ACTIVITIES OF DAILY LIVING (ADL)
STAND: ACTIVITY IS MINIMALLY DIFFICULT
KNEE_ACTIVITY_OF_DAILY_LIVING_SUM: 61
SWELLING: I DO NOT HAVE THE SYMPTOM
SQUAT: ACTIVITY IS MINIMALLY DIFFICULT
LIMPING: I DO NOT HAVE THE SYMPTOM
STIFFNESS: I DO NOT HAVE THE SYMPTOM
HOW_WOULD_YOU_RATE_THE_OVERALL_FUNCTION_OF_YOUR_KNEE_DURING_YOUR_USUAL_DAILY_ACTIVITIES?: NEARLY NORMAL
KNEEL ON THE FRONT OF YOUR KNEE: ACTIVITY IS MINIMALLY DIFFICULT
WEAKNESS: I HAVE THE SYMPTOM BUT IT DOES NOT AFFECT MY ACTIVITY
GIVING WAY, BUCKLING OR SHIFTING OF KNEE: I DO NOT HAVE THE SYMPTOM
WALK: ACTIVITY IS NOT DIFFICULT
GO DOWN STAIRS: ACTIVITY IS MINIMALLY DIFFICULT
HOW_WOULD_YOU_RATE_THE_CURRENT_FUNCTION_OF_YOUR_KNEE_DURING_YOUR_USUAL_DAILY_ACTIVITIES_ON_A_SCALE_FROM_0_TO_100_WITH_100_BEING_YOUR_LEVEL_OF_KNEE_FUNCTION_PRIOR_TO_YOUR_INJURY_AND_0_BEING_THE_INABILITY_TO_PERFORM_ANY_OF_YOUR_USUAL_DAILY_ACTIVITIES?: 98
AS_A_RESULT_OF_YOUR_KNEE_INJURY,_HOW_WOULD_YOU_RATE_YOUR_CURRENT_LEVEL_OF_DAILY_ACTIVITY?: NEARLY NORMAL
GO UP STAIRS: ACTIVITY IS MINIMALLY DIFFICULT
RISE FROM A CHAIR: ACTIVITY IS MINIMALLY DIFFICULT
KNEE_ACTIVITY_OF_DAILY_LIVING_SCORE: 87.14
PAIN: I HAVE THE SYMPTOM BUT IT DOES NOT AFFECT MY ACTIVITY
SIT WITH YOUR KNEE BENT: ACTIVITY IS MINIMALLY DIFFICULT
RAW_SCORE: 61

## 2025-06-25 NOTE — PROGRESS NOTES
06/25/25 0500   Appointment Info   Signing clinician's name / credentials Ian Gallagher, PT, DPT, CSCS, CLT   Total/Authorized Visits [employer]   Visits Used 4   Medical Diagnosis Chronic Right Knee Pain   PT Tx Diagnosis Chronic Right Knee Pain   Virtual Visit   Time of service begin: 0938   Time of service end: 1001   Provider Location (Distant Site) Office   Patient Location (Originating Site) Home/other residence   Virtual Visit Rationale The virtual visit will provide the care the patient needs. We reviewed the patient's chart, and PTRx prescription to determine the following telemedicine visit is appropriate and effective for the patient's care.   Progress Note/Certification   Start of Care Date 03/13/25   Onset of illness/injury or Date of Surgery 02/11/25   Therapy Frequency 2x a month   Predicted Duration 3 months   Certification date from 03/13/25   Certification date to 06/05/25   Progress Note Due Date 06/05/25   Progress Note Completed Date 03/13/25   GOALS   PT Goals 2   PT Goal 1   Goal Identifier Stairs   Goal Description Noah will be able to ascend/descend 2 flights of stairs without knee pain   Rationale to maximize safety and independence with performance of ADLs and functional tasks;to maximize safety and independence within the home;to maximize safety and independence within the community;to maximize safety and independence with transportation;to maximize safety and independence with self cares   Goal Progress Pain 0/10 going up and down the stairs. It does depend on the days (If he walks a lot or mows the lawn, then he gets 1/10 pain)   Target Date 06/05/25   Date Met 05/21/25   PT Goal 2   Goal Identifier Carrying   Goal Description Will be able to mow the lawn or carry 10 lbs up and down the stairs without right knee pain   Rationale to maximize safety and independence with performance of ADLs and functional tasks;to maximize safety and independence within the home;to maximize safety and  independence with transportation;to maximize safety and independence within the community;to maximize safety and independence with self cares   Goal Progress Matthew can mow a 0.5 acre of his lawn with 2-3/10 pain only when doing a hill/angle while mowing. The rest of mowing is painfree when on flat ground. When he ascends / descends a 3 flights of stairs carrying laundry his knee also hurts 2/10. If he uses his shop vac, it's more difficult and more painful when exceeding more than 10 lbs   Target Date 07/16/25   Subjective Report   Subjective Report He reports things are feeling good with barely any pain at all. He reports less than 1/10 and probably 0.5/10.  He reports doing the exercises just once and stopped because it's feeling well.   Objective Measures   Objective Measures Objective Measure 1;Objective Measure 2;Objective Measure 3   Objective Measure 1   Objective Measure Pain   Details 0.5 / 10 on Arrival and 0.5 /10 when going up and down the steps   Objective Measure 2   Objective Measure Knee Outcome Survey   Details 61 (87.14%)   Objective Measure 3   Objective Measure Knee AROM   Details WNL (Visually observed at approximately 0 - 145)   Treatment Interventions (PT)   Interventions Therapeutic Procedure/Exercise;Therapeutic Activity   Therapeutic Procedure/Exercise   Therapeutic Procedures: strength, endurance, ROM, flexibility minutes (35016) 12   PTRx Ther Proc 1 Squats   PTRx Ther Proc 1 - Details 1x10 with review on form (Caused tightness and tiredness in his knees with deep squats)   PTRx Ther Proc 2 Side Stepping With Theraband   PTRx Ther Proc 2 - Details 1x10 without band and performed each direction   PTRx Ther Proc 3 Lateral Step Down   PTRx Ther Proc 3 - Details Reviewed lateral step downs with the therapeutic activity and stair navigation   Therapeutic Activity   Therapeutic Activities: dynamic activities to improve functional performance minutes (90397) 11   Ther Act 1 Stairs   Ther Act 1 -  Details 1 flight of stairs ascending and descending, 2 flights slowly with focus on eccentrics (Created 0.5 to 3 / 10 discomfort in the medial knee, however demonstrated genu valgus while performing).   Skilled Intervention Stair ambulation   Patient Response/Progress Demonstrates genu valgus while navigating stairs and slight medial knee pain   Plan   Home program See PTRx   Updates to plan of care Continue per POC   Plan for next session Discharged   Total Session Time   Timed Code Treatment Minutes 23   Total Treatment Time (sum of timed and untimed services) 23           DISCHARGE  Reason for Discharge: Matthew is ready to discharge. He has only done his HEP once in the last month and has managed to maintain 0.5 / 10 pain in his knee with all functional tasks and can navigate 3 flights of stairs or mow his lawn without significant pain. Therefor, Matthew is ready to discharge and transition to independent management. If his symptoms persist or worsen we recommend Matthew return to PT.    Equipment Issued: None    Discharge Plan: Patient to continue home program.    Referring Provider:  Feliciano Em MD